# Patient Record
Sex: FEMALE | Race: WHITE | NOT HISPANIC OR LATINO | Employment: FULL TIME | URBAN - NONMETROPOLITAN AREA
[De-identification: names, ages, dates, MRNs, and addresses within clinical notes are randomized per-mention and may not be internally consistent; named-entity substitution may affect disease eponyms.]

---

## 2023-04-24 PROBLEM — R80.9 PROTEINURIA: Status: ACTIVE | Noted: 2023-04-24

## 2023-04-24 PROBLEM — K90.0 ACD (ADULT CELIAC DISEASE) (HHS-HCC): Status: ACTIVE | Noted: 2023-04-24

## 2023-04-24 PROBLEM — K21.9 GERD (GASTROESOPHAGEAL REFLUX DISEASE): Status: ACTIVE | Noted: 2023-04-24

## 2023-04-24 PROBLEM — M54.2 NECK PAIN, CHRONIC: Status: ACTIVE | Noted: 2023-04-24

## 2023-04-24 PROBLEM — R00.2 PALPITATIONS: Status: ACTIVE | Noted: 2023-04-24

## 2023-04-24 PROBLEM — J45.21 EXACERBATION OF INTERMITTENT ASTHMA (HHS-HCC): Status: ACTIVE | Noted: 2023-04-24

## 2023-04-24 PROBLEM — K20.0 EOSINOPHILIC ESOPHAGITIS: Status: ACTIVE | Noted: 2023-04-24

## 2023-04-24 PROBLEM — R91.8 LUNG NODULES: Status: ACTIVE | Noted: 2023-04-24

## 2023-04-24 PROBLEM — I27.20 PULMONARY HYPERTENSION (MULTI): Status: ACTIVE | Noted: 2023-04-24

## 2023-04-24 PROBLEM — E66.01 MORBID OBESITY WITH BODY MASS INDEX (BMI) OF 40.0 OR HIGHER (MULTI): Status: ACTIVE | Noted: 2023-04-24

## 2023-04-24 PROBLEM — M79.7 FIBROMYALGIA: Status: ACTIVE | Noted: 2023-04-24

## 2023-04-24 PROBLEM — K58.9 IBS (IRRITABLE BOWEL SYNDROME): Status: ACTIVE | Noted: 2023-04-24

## 2023-04-24 PROBLEM — E11.9 DIET-CONTROLLED TYPE 2 DIABETES MELLITUS (MULTI): Status: ACTIVE | Noted: 2023-04-24

## 2023-04-24 PROBLEM — R51.9 CHRONIC DAILY HEADACHE: Status: ACTIVE | Noted: 2023-04-24

## 2023-04-24 PROBLEM — E11.59 HYPERTENSION ASSOCIATED WITH DIABETES (MULTI): Status: ACTIVE | Noted: 2023-04-24

## 2023-04-24 PROBLEM — G47.00 INSOMNIA: Status: ACTIVE | Noted: 2023-04-24

## 2023-04-24 PROBLEM — T78.40XA ALLERGIES: Status: ACTIVE | Noted: 2023-04-24

## 2023-04-24 PROBLEM — E78.1 HYPERTRIGLYCERIDEMIA: Status: ACTIVE | Noted: 2023-04-24

## 2023-04-24 PROBLEM — R53.83 FATIGUE: Status: ACTIVE | Noted: 2023-04-24

## 2023-04-24 PROBLEM — E53.8 LOW SERUM VITAMIN B12: Status: ACTIVE | Noted: 2023-04-24

## 2023-04-24 PROBLEM — G89.29 NECK PAIN, CHRONIC: Status: ACTIVE | Noted: 2023-04-24

## 2023-04-24 PROBLEM — M54.31 SCIATICA OF RIGHT SIDE: Status: ACTIVE | Noted: 2023-04-24

## 2023-04-24 PROBLEM — R31.29 OTHER MICROSCOPIC HEMATURIA: Status: ACTIVE | Noted: 2023-04-24

## 2023-04-24 PROBLEM — F32.1 DEPRESSION, MAJOR, SINGLE EPISODE, MODERATE (MULTI): Status: ACTIVE | Noted: 2023-04-24

## 2023-04-24 PROBLEM — M17.0 PRIMARY OSTEOARTHRITIS OF BOTH KNEES: Status: ACTIVE | Noted: 2023-04-24

## 2023-04-24 PROBLEM — G25.81 RLS (RESTLESS LEGS SYNDROME): Status: ACTIVE | Noted: 2023-04-24

## 2023-04-24 PROBLEM — R09.82 PND (POST-NASAL DRIP): Status: ACTIVE | Noted: 2023-04-24

## 2023-04-24 PROBLEM — I48.0 PAROXYSMAL ATRIAL FIBRILLATION (MULTI): Status: ACTIVE | Noted: 2023-04-24

## 2023-04-24 PROBLEM — I15.2 HYPERTENSION ASSOCIATED WITH DIABETES (MULTI): Status: ACTIVE | Noted: 2023-04-24

## 2023-04-24 PROBLEM — M25.561 KNEE PAIN, BILATERAL: Status: ACTIVE | Noted: 2023-04-24

## 2023-04-24 PROBLEM — G47.33 OBSTRUCTIVE SLEEP APNEA, ADULT: Status: ACTIVE | Noted: 2023-04-24

## 2023-04-24 PROBLEM — G62.9 NEUROPATHY, PERIPHERAL: Status: ACTIVE | Noted: 2023-04-24

## 2023-04-24 PROBLEM — E55.9 VITAMIN D DEFICIENCY: Status: ACTIVE | Noted: 2023-04-24

## 2023-04-24 PROBLEM — R10.9 PAIN, FLANK, BILATERAL: Status: ACTIVE | Noted: 2023-04-24

## 2023-04-24 PROBLEM — R29.898 RIGHT ARM WEAKNESS: Status: ACTIVE | Noted: 2023-04-24

## 2023-04-24 PROBLEM — M25.562 KNEE PAIN, BILATERAL: Status: ACTIVE | Noted: 2023-04-24

## 2023-04-24 PROBLEM — J45.30 MILD PERSISTENT ASTHMA (HHS-HCC): Status: ACTIVE | Noted: 2023-04-24

## 2023-04-24 PROBLEM — G56.03 BILATERAL CARPAL TUNNEL SYNDROME: Status: ACTIVE | Noted: 2023-04-24

## 2023-04-24 PROBLEM — K75.81 NASH (NONALCOHOLIC STEATOHEPATITIS): Status: ACTIVE | Noted: 2023-04-24

## 2023-04-24 PROBLEM — G43.109 OCULAR MIGRAINE: Status: ACTIVE | Noted: 2023-04-24

## 2023-04-24 PROBLEM — F03.90 DEMENTIA (MULTI): Status: ACTIVE | Noted: 2023-04-24

## 2023-04-24 PROBLEM — J45.909 ASTHMA (HHS-HCC): Status: ACTIVE | Noted: 2023-04-24

## 2023-04-24 PROBLEM — K90.89 BILE SALT-INDUCED DIARRHEA (HHS-HCC): Status: ACTIVE | Noted: 2023-04-24

## 2023-04-24 PROBLEM — M47.816 DJD (DEGENERATIVE JOINT DISEASE), LUMBAR: Status: ACTIVE | Noted: 2023-04-24

## 2023-04-24 RX ORDER — FLUTICASONE PROPIONATE 50 MCG
1 SPRAY, SUSPENSION (ML) NASAL DAILY
COMMUNITY
Start: 2020-01-16 | End: 2023-06-16 | Stop reason: SDUPTHER

## 2023-04-24 RX ORDER — MOMETASONE FUROATE AND FORMOTEROL FUMARATE DIHYDRATE 200; 5 UG/1; UG/1
2 AEROSOL RESPIRATORY (INHALATION)
COMMUNITY
Start: 2020-04-09 | End: 2023-06-16 | Stop reason: SDUPTHER

## 2023-04-24 RX ORDER — ALBUTEROL SULFATE 90 UG/1
1 AEROSOL, METERED RESPIRATORY (INHALATION) EVERY 4 HOURS PRN
COMMUNITY
Start: 2020-04-09 | End: 2023-06-16 | Stop reason: SDUPTHER

## 2023-04-24 RX ORDER — TOPIRAMATE 100 MG/1
100 TABLET, FILM COATED ORAL 2 TIMES DAILY
COMMUNITY
Start: 2021-01-19 | End: 2023-07-14 | Stop reason: SDUPTHER

## 2023-04-25 ENCOUNTER — APPOINTMENT (OUTPATIENT)
Dept: PRIMARY CARE | Facility: CLINIC | Age: 48
End: 2023-04-25
Payer: MEDICAID

## 2023-04-28 ENCOUNTER — OFFICE VISIT (OUTPATIENT)
Dept: PRIMARY CARE | Facility: CLINIC | Age: 48
End: 2023-04-28
Payer: MEDICAID

## 2023-04-28 VITALS
HEIGHT: 65 IN | HEART RATE: 77 BPM | WEIGHT: 268 LBS | BODY MASS INDEX: 44.65 KG/M2 | SYSTOLIC BLOOD PRESSURE: 133 MMHG | DIASTOLIC BLOOD PRESSURE: 80 MMHG

## 2023-04-28 DIAGNOSIS — E53.8 LOW SERUM VITAMIN B12: ICD-10-CM

## 2023-04-28 DIAGNOSIS — E55.9 VITAMIN D DEFICIENCY: ICD-10-CM

## 2023-04-28 DIAGNOSIS — M79.7 FIBROMYALGIA: ICD-10-CM

## 2023-04-28 DIAGNOSIS — I48.0 PAROXYSMAL ATRIAL FIBRILLATION (MULTI): ICD-10-CM

## 2023-04-28 DIAGNOSIS — R52 BODY ACHES: ICD-10-CM

## 2023-04-28 DIAGNOSIS — I27.20 PULMONARY HYPERTENSION (MULTI): ICD-10-CM

## 2023-04-28 DIAGNOSIS — E66.01 CLASS 3 SEVERE OBESITY DUE TO EXCESS CALORIES WITH SERIOUS COMORBIDITY AND BODY MASS INDEX (BMI) OF 40.0 TO 44.9 IN ADULT (MULTI): ICD-10-CM

## 2023-04-28 DIAGNOSIS — E11.59 HYPERTENSION ASSOCIATED WITH DIABETES (MULTI): Primary | ICD-10-CM

## 2023-04-28 DIAGNOSIS — I15.2 HYPERTENSION ASSOCIATED WITH DIABETES (MULTI): Primary | ICD-10-CM

## 2023-04-28 PROBLEM — J45.21 EXACERBATION OF INTERMITTENT ASTHMA (HHS-HCC): Status: RESOLVED | Noted: 2023-04-24 | Resolved: 2023-04-28

## 2023-04-28 PROBLEM — M54.31 SCIATICA OF RIGHT SIDE: Status: RESOLVED | Noted: 2023-04-24 | Resolved: 2023-04-28

## 2023-04-28 PROBLEM — E66.813 CLASS 3 SEVERE OBESITY DUE TO EXCESS CALORIES WITH SERIOUS COMORBIDITY AND BODY MASS INDEX (BMI) OF 40.0 TO 44.9 IN ADULT: Status: ACTIVE | Noted: 2023-04-24

## 2023-04-28 PROBLEM — J45.909 ASTHMA (HHS-HCC): Status: RESOLVED | Noted: 2023-04-24 | Resolved: 2023-04-28

## 2023-04-28 PROCEDURE — 3075F SYST BP GE 130 - 139MM HG: CPT | Performed by: NURSE PRACTITIONER

## 2023-04-28 PROCEDURE — 3008F BODY MASS INDEX DOCD: CPT | Performed by: NURSE PRACTITIONER

## 2023-04-28 PROCEDURE — 3079F DIAST BP 80-89 MM HG: CPT | Performed by: NURSE PRACTITIONER

## 2023-04-28 PROCEDURE — 99204 OFFICE O/P NEW MOD 45 MIN: CPT | Performed by: NURSE PRACTITIONER

## 2023-04-28 PROCEDURE — 1036F TOBACCO NON-USER: CPT | Performed by: NURSE PRACTITIONER

## 2023-04-28 RX ORDER — FUROSEMIDE 20 MG/1
20 TABLET ORAL
COMMUNITY

## 2023-04-28 ASSESSMENT — ENCOUNTER SYMPTOMS
ARTHRALGIAS: 0
APNEA: 0
EYE PAIN: 0
BACK PAIN: 0
MYALGIAS: 0
NAUSEA: 0
UNEXPECTED WEIGHT CHANGE: 0
BLOOD IN STOOL: 0
JOINT SWELLING: 0
WOUND: 0
SLEEP DISTURBANCE: 0
DIFFICULTY URINATING: 0
SORE THROAT: 0
NERVOUS/ANXIOUS: 0
FREQUENCY: 0
DYSURIA: 0
COUGH: 0
SEIZURES: 0
NUMBNESS: 0
PALPITATIONS: 0
WHEEZING: 0
WEAKNESS: 0
NECK PAIN: 0
FATIGUE: 0
CONSTIPATION: 0
CHOKING: 0
CHEST TIGHTNESS: 0
EYE REDNESS: 0
VOMITING: 0
TROUBLE SWALLOWING: 0
SPEECH DIFFICULTY: 0
ABDOMINAL DISTENTION: 0
ABDOMINAL PAIN: 0
FLANK PAIN: 0
PHOTOPHOBIA: 0
HEADACHES: 0
DIARRHEA: 0
CHILLS: 0
FEVER: 0
HEMATURIA: 0
CONFUSION: 0
DIZZINESS: 0
SHORTNESS OF BREATH: 0
FACIAL ASYMMETRY: 0

## 2023-04-28 ASSESSMENT — PATIENT HEALTH QUESTIONNAIRE - PHQ9
1. LITTLE INTEREST OR PLEASURE IN DOING THINGS: NOT AT ALL
2. FEELING DOWN, DEPRESSED OR HOPELESS: NOT AT ALL
SUM OF ALL RESPONSES TO PHQ9 QUESTIONS 1 AND 2: 0

## 2023-04-28 NOTE — PROGRESS NOTES
"Subjective   Patient ID: Inge Vance is a 47 y.o. female who presents for Establish Care (RE-EST. C/O BODY/SKIN PAIN, FATIGUE HAS NO ENERGY DX WITH MILD SLEEP APNEA BUT INSURANCE WOULD NOT APPROVE A CPAP).      HPI:  Presents today for C/O  BODY PAIN, FATIGUE, AND NO ENERGY X SEVERAL MONTHS - YEARS  modifying factors consists of HAS KENDY AND FIBRO  associated symptoms consist of B/L KNEES, HIPS, FEET MOSTLY  prior treatment consists of medication OTC TYLENOL AND IBUPROFEN        Visit Vitals  /80   Pulse 77   Ht 1.651 m (5' 5\")   Wt 122 kg (268 lb)   BMI 44.60 kg/m²   Smoking Status Never   BSA 2.37 m²        Review of Systems   Constitutional:  Negative for chills, fatigue, fever and unexpected weight change.   HENT:  Negative for congestion, ear pain, sore throat and trouble swallowing.    Eyes:  Negative for photophobia, pain, redness and visual disturbance.   Respiratory:  Negative for apnea, cough, choking, chest tightness, shortness of breath and wheezing.    Cardiovascular:  Negative for chest pain, palpitations and leg swelling.   Gastrointestinal:  Negative for abdominal distention, abdominal pain, blood in stool, constipation, diarrhea, nausea and vomiting.   Genitourinary:  Negative for difficulty urinating, dysuria, flank pain, frequency, hematuria and urgency.   Musculoskeletal:  Negative for arthralgias, back pain, gait problem, joint swelling, myalgias and neck pain.   Skin:  Negative for rash and wound.   Neurological:  Negative for dizziness, seizures, syncope, facial asymmetry, speech difficulty, weakness, numbness and headaches.   Psychiatric/Behavioral:  Negative for confusion, sleep disturbance and suicidal ideas. The patient is not nervous/anxious.        Objective     Physical Exam  Constitutional:       Appearance: Normal appearance. She is normal weight.   HENT:      Head: Normocephalic.   Eyes:      Extraocular Movements: Extraocular movements intact.      Conjunctiva/sclera: " Conjunctivae normal.      Pupils: Pupils are equal, round, and reactive to light.   Cardiovascular:      Rate and Rhythm: Normal rate and regular rhythm.      Pulses: Normal pulses.      Heart sounds: Normal heart sounds.   Pulmonary:      Effort: Pulmonary effort is normal.      Breath sounds: Normal breath sounds.   Musculoskeletal:         General: Normal range of motion.      Cervical back: Normal range of motion.      Comments: JOINT PAIN   Skin:     General: Skin is warm and dry.   Neurological:      General: No focal deficit present.      Mental Status: She is alert and oriented to person, place, and time.   Psychiatric:         Mood and Affect: Mood normal.         Behavior: Behavior normal.         Thought Content: Thought content normal.         Judgment: Judgment normal.            Assessment/Plan   Problem List Items Addressed This Visit          Nervous    Body aches    Relevant Orders    Hemoglobin A1C    Comprehensive Metabolic Panel    Lipid Panel    TSH with reflex to Free T4 if abnormal    CBC and Auto Differential    Uric Acid    Vitamin D, Total    Vitamin B12    Ferritin    Folate    Iron and TIBC    Parathyroid Hormone, Intact    Magnesium    CARSON with Reflex to CARLOS    Citrulline Antibody, IgG    C-Reactive Protein    Rheumatoid Factor    Sedimentation Rate    Mononucleosis screen    Lyme Ab Screen + Reflex To Immunoblot; >4 Wks Post Symptoms       Circulatory    Hypertension associated with diabetes (CMS/HCC) - Primary    Relevant Orders    Hemoglobin A1C    Comprehensive Metabolic Panel    Lipid Panel    TSH with reflex to Free T4 if abnormal    CBC and Auto Differential    Uric Acid    Vitamin D, Total    Vitamin B12    Ferritin    Folate    Iron and TIBC    Parathyroid Hormone, Intact    Magnesium    CARSON with Reflex to CARLOS    Citrulline Antibody, IgG    C-Reactive Protein    Rheumatoid Factor    Sedimentation Rate    Paroxysmal atrial fibrillation (CMS/HCC)    Pulmonary hypertension  (CMS/[facility])       Musculoskeletal    Fibromyalgia    Relevant Orders    Hemoglobin A1C    Comprehensive Metabolic Panel    Lipid Panel    TSH with reflex to Free T4 if abnormal    CBC and Auto Differential    Uric Acid    Vitamin D, Total    Vitamin B12    Ferritin    Folate    Iron and TIBC    Parathyroid Hormone, Intact    Magnesium    CARSON with Reflex to CARLOS    Citrulline Antibody, IgG    C-Reactive Protein    Rheumatoid Factor    Sedimentation Rate       Endocrine/Metabolic    Class 3 severe obesity due to excess calories with serious comorbidity and body mass index (BMI) of 40.0 to 44.9 in adult (CMS/[facility])    Vitamin D deficiency    Relevant Orders    Hemoglobin A1C    Comprehensive Metabolic Panel    Lipid Panel    TSH with reflex to Free T4 if abnormal    CBC and Auto Differential    Uric Acid    Vitamin D, Total    Vitamin B12    Ferritin    Folate    Iron and TIBC    Parathyroid Hormone, Intact    Magnesium    CARSON with Reflex to CARLOS    Citrulline Antibody, IgG    C-Reactive Protein    Rheumatoid Factor    Sedimentation Rate       Hematologic    Low serum vitamin B12    Relevant Orders    Hemoglobin A1C    Comprehensive Metabolic Panel    Lipid Panel    TSH with reflex to Free T4 if abnormal    CBC and Auto Differential    Uric Acid    Vitamin D, Total    Vitamin B12    Ferritin    Folate    Iron and TIBC    Parathyroid Hormone, Intact    Magnesium    CARSON with Reflex to CARLOS    Citrulline Antibody, IgG    C-Reactive Protein    Rheumatoid Factor    Sedimentation Rate     PLEASE MONITOR CLOSELY FOR ANY UNTOWARD SIDE EFFECTS OR COMPLICATIONS OF MEDICATIONS. PATIENT IS STRONGLY ADVISED TO BE COMPLIANT WITH RECOMMENDATIONS. QUESTIONS AND CONCERNS WERE ADDRESSED. INSTRUCTED TO CALL, RETURN SOONER, OR GO TO THE ER,  IF SYMPTOMS PERSIST OR WORSEN. THEY VOICED UNDERSTANDING AND  DENIES FURTHER QUESTIONS AT THIS TIME.    TIME CODE  1. PREPARATION FOR PATIENT'S VISIT (REVIEWING CHART, CURRENT MEDICAL RECORDS, OUTSIDE HEALTH  PROVIDER RECORDS, PREVIOUS HISTORY, EXAM, TEST, PROCEDURE, AND MEDICATIONS)  2. FACE TO FACE ENCOUNTER OBTAINING HISTORY FROM THE PATIENT/FAMILY/CAREGIVERS; PERFORMING EVALUATION AND EXAMINATION; ORDERING TESTS OR PROCEDURES; REFERRING AND COMMUNICATING WITH OTHER HEALTHCARE PROVIDERS; COUNSELING AND EDUCATION OF THE PATIENT/FAMILY/CAREGIVERS; INDEPENDENTLY INTERPRETING RESULTS (TESTS, LABS, PROCEDURES, IMAGING) AND COMMUNICATING AND EXPLAINING RESULTS TO THE PATIENT/FAMILY/CAREGIVERS  3. COORDINATION OF CARE; PREPARING AND PRINTING DISCHARGE INSTRUCTIONS AND ANY EDUCATIONAL MATERIAL FOR THE PATIENT/FAMILY/CAREGIVERS. DOCUMENTING CLINICAL INFORMATION IN THE ELECTRONIC MEDICAL RECORD   4. REVIEWING OARRS AS NEEDED    MDM  1) COMPLEXITY: MORE THAN 1 STABLE CHRONIC CONDITION ADDRESSED OR 1 ACUTE ILLNESS ADDRESSED   2)DATA: TESTS INTERPRETED AND OR ORDERED, TOOK INDEPENDENT HISTORY OR RECORDS REVIEWED  3)RISK: MODERATE RISK DUE TO NATURE OF MEDICAL CONDITIONS/COMORBIDITY OR MEDICATIONS ORDERED OR SURGICAL OR PROCEDURE REFERRAL    2 WEEKS WITH FASTING LABS

## 2023-04-29 ENCOUNTER — LAB (OUTPATIENT)
Dept: LAB | Facility: LAB | Age: 48
End: 2023-04-29
Payer: MEDICAID

## 2023-04-29 DIAGNOSIS — R52 BODY ACHES: ICD-10-CM

## 2023-04-29 DIAGNOSIS — I15.2 HYPERTENSION ASSOCIATED WITH DIABETES (MULTI): ICD-10-CM

## 2023-04-29 DIAGNOSIS — E55.9 VITAMIN D DEFICIENCY: ICD-10-CM

## 2023-04-29 DIAGNOSIS — M79.7 FIBROMYALGIA: ICD-10-CM

## 2023-04-29 DIAGNOSIS — E11.59 HYPERTENSION ASSOCIATED WITH DIABETES (MULTI): ICD-10-CM

## 2023-04-29 DIAGNOSIS — E53.8 LOW SERUM VITAMIN B12: ICD-10-CM

## 2023-04-29 LAB
ALANINE AMINOTRANSFERASE (SGPT) (U/L) IN SER/PLAS: 17 U/L (ref 7–45)
ALBUMIN (G/DL) IN SER/PLAS: 4 G/DL (ref 3.4–5)
ALBUMIN (MG/L) IN URINE: 772.1 MG/L
ALBUMIN/CREATININE (UG/MG) IN URINE: 543.7 UG/MG CRT (ref 0–30)
ALKALINE PHOSPHATASE (U/L) IN SER/PLAS: 51 U/L (ref 33–110)
ANION GAP IN SER/PLAS: 11 MMOL/L (ref 10–20)
ASPARTATE AMINOTRANSFERASE (SGOT) (U/L) IN SER/PLAS: 12 U/L (ref 9–39)
BASOPHILS (10*3/UL) IN BLOOD BY AUTOMATED COUNT: 0.05 X10E9/L (ref 0–0.1)
BASOPHILS/100 LEUKOCYTES IN BLOOD BY AUTOMATED COUNT: 0.7 % (ref 0–2)
BILIRUBIN TOTAL (MG/DL) IN SER/PLAS: 0.4 MG/DL (ref 0–1.2)
C REACTIVE PROTEIN (MG/L) IN SER/PLAS: 0.65 MG/DL
CALCIDIOL (25 OH VITAMIN D3) (NG/ML) IN SER/PLAS: 14 NG/ML
CALCIUM (MG/DL) IN SER/PLAS: 8.9 MG/DL (ref 8.6–10.3)
CARBON DIOXIDE, TOTAL (MMOL/L) IN SER/PLAS: 25 MMOL/L (ref 21–32)
CHLORIDE (MMOL/L) IN SER/PLAS: 108 MMOL/L (ref 98–107)
CHOLESTEROL (MG/DL) IN SER/PLAS: 179 MG/DL (ref 0–199)
CHOLESTEROL IN HDL (MG/DL) IN SER/PLAS: 52 MG/DL
CHOLESTEROL/HDL RATIO: 3.4
COBALAMIN (VITAMIN B12) (PG/ML) IN SER/PLAS: 349 PG/ML (ref 211–911)
CREATININE (MG/DL) IN SER/PLAS: 0.58 MG/DL (ref 0.5–1.05)
CREATININE (MG/DL) IN URINE: 142 MG/DL (ref 20–320)
EOSINOPHILS (10*3/UL) IN BLOOD BY AUTOMATED COUNT: 0.33 X10E9/L (ref 0–0.7)
EOSINOPHILS/100 LEUKOCYTES IN BLOOD BY AUTOMATED COUNT: 4.7 % (ref 0–6)
ERYTHROCYTE DISTRIBUTION WIDTH (RATIO) BY AUTOMATED COUNT: 11.9 % (ref 11.5–14.5)
ERYTHROCYTE MEAN CORPUSCULAR HEMOGLOBIN CONCENTRATION (G/DL) BY AUTOMATED: 32.8 G/DL (ref 32–36)
ERYTHROCYTE MEAN CORPUSCULAR VOLUME (FL) BY AUTOMATED COUNT: 93 FL (ref 80–100)
ERYTHROCYTES (10*6/UL) IN BLOOD BY AUTOMATED COUNT: 4.29 X10E12/L (ref 4–5.2)
ESTIMATED AVERAGE GLUCOSE FOR HBA1C: 105 MG/DL
FERRITIN (UG/LL) IN SER/PLAS: 120 UG/L (ref 8–150)
FOLATE (NG/ML) IN SER/PLAS: 12.9 NG/ML
GFR FEMALE: >90 ML/MIN/1.73M2
GLUCOSE (MG/DL) IN SER/PLAS: 97 MG/DL (ref 74–99)
HEMATOCRIT (%) IN BLOOD BY AUTOMATED COUNT: 40 % (ref 36–46)
HEMOGLOBIN (G/DL) IN BLOOD: 13.1 G/DL (ref 12–16)
HEMOGLOBIN A1C/HEMOGLOBIN TOTAL IN BLOOD: 5.3 %
IMMATURE GRANULOCYTES/100 LEUKOCYTES IN BLOOD BY AUTOMATED COUNT: 0.3 % (ref 0–0.9)
IRON (UG/DL) IN SER/PLAS: 85 UG/DL (ref 35–150)
IRON BINDING CAPACITY (UG/DL) IN SER/PLAS: 297 UG/DL (ref 240–445)
IRON SATURATION (%) IN SER/PLAS: 29 % (ref 25–45)
LDL: 82 MG/DL (ref 0–99)
LEUKOCYTES (10*3/UL) IN BLOOD BY AUTOMATED COUNT: 7 X10E9/L (ref 4.4–11.3)
LYMPHOCYTES (10*3/UL) IN BLOOD BY AUTOMATED COUNT: 2.08 X10E9/L (ref 1.2–4.8)
LYMPHOCYTES/100 LEUKOCYTES IN BLOOD BY AUTOMATED COUNT: 29.8 % (ref 13–44)
MAGNESIUM (MG/DL) IN SER/PLAS: 1.77 MG/DL (ref 1.6–2.4)
MONOCYTES (10*3/UL) IN BLOOD BY AUTOMATED COUNT: 0.48 X10E9/L (ref 0.1–1)
MONOCYTES/100 LEUKOCYTES IN BLOOD BY AUTOMATED COUNT: 6.9 % (ref 2–10)
MONONUCLEOSIS SCREEN: NEGATIVE
NEUTROPHILS (10*3/UL) IN BLOOD BY AUTOMATED COUNT: 4.01 X10E9/L (ref 1.2–7.7)
NEUTROPHILS/100 LEUKOCYTES IN BLOOD BY AUTOMATED COUNT: 57.6 % (ref 40–80)
NON HDL CHOLESTEROL: 127 MG/DL
PLATELETS (10*3/UL) IN BLOOD AUTOMATED COUNT: 382 X10E9/L (ref 150–450)
POTASSIUM (MMOL/L) IN SER/PLAS: 4.2 MMOL/L (ref 3.5–5.3)
PROTEIN TOTAL: 6.2 G/DL (ref 6.4–8.2)
RHEUMATOID FACTOR (IU/ML) IN SERUM OR PLASMA: <10 IU/ML (ref 0–15)
SEDIMENTATION RATE, ERYTHROCYTE: 15 MM/H (ref 0–20)
SODIUM (MMOL/L) IN SER/PLAS: 140 MMOL/L (ref 136–145)
THYROTROPIN (MIU/L) IN SER/PLAS BY DETECTION LIMIT <= 0.05 MIU/L: 2.51 MIU/L (ref 0.44–3.98)
TRIGLYCERIDE (MG/DL) IN SER/PLAS: 226 MG/DL (ref 0–149)
URATE (MG/DL) IN SER/PLAS: 5.8 MG/DL (ref 2.3–6.7)
UREA NITROGEN (MG/DL) IN SER/PLAS: 11 MG/DL (ref 6–23)
VLDL: 45 MG/DL (ref 0–40)

## 2023-04-29 PROCEDURE — 82043 UR ALBUMIN QUANTITATIVE: CPT

## 2023-04-29 PROCEDURE — 83550 IRON BINDING TEST: CPT

## 2023-04-29 PROCEDURE — 85652 RBC SED RATE AUTOMATED: CPT

## 2023-04-29 PROCEDURE — 86140 C-REACTIVE PROTEIN: CPT

## 2023-04-29 PROCEDURE — 86038 ANTINUCLEAR ANTIBODIES: CPT

## 2023-04-29 PROCEDURE — 86431 RHEUMATOID FACTOR QUANT: CPT

## 2023-04-29 PROCEDURE — 82570 ASSAY OF URINE CREATININE: CPT

## 2023-04-29 PROCEDURE — 83036 HEMOGLOBIN GLYCOSYLATED A1C: CPT

## 2023-04-29 PROCEDURE — 83970 ASSAY OF PARATHORMONE: CPT

## 2023-04-29 PROCEDURE — 86200 CCP ANTIBODY: CPT

## 2023-04-29 PROCEDURE — 84443 ASSAY THYROID STIM HORMONE: CPT

## 2023-04-29 PROCEDURE — 86618 LYME DISEASE ANTIBODY: CPT

## 2023-04-29 PROCEDURE — 82746 ASSAY OF FOLIC ACID SERUM: CPT

## 2023-04-29 PROCEDURE — 85025 COMPLETE CBC W/AUTO DIFF WBC: CPT

## 2023-04-29 PROCEDURE — 86308 HETEROPHILE ANTIBODY SCREEN: CPT

## 2023-04-29 PROCEDURE — 84550 ASSAY OF BLOOD/URIC ACID: CPT

## 2023-04-29 PROCEDURE — 83540 ASSAY OF IRON: CPT

## 2023-04-29 PROCEDURE — 82728 ASSAY OF FERRITIN: CPT

## 2023-04-29 PROCEDURE — 82306 VITAMIN D 25 HYDROXY: CPT

## 2023-04-29 PROCEDURE — 83735 ASSAY OF MAGNESIUM: CPT

## 2023-04-29 PROCEDURE — 86225 DNA ANTIBODY NATIVE: CPT

## 2023-04-29 PROCEDURE — 86039 ANTINUCLEAR ANTIBODIES (ANA): CPT

## 2023-04-29 PROCEDURE — 80053 COMPREHEN METABOLIC PANEL: CPT

## 2023-04-29 PROCEDURE — 36415 COLL VENOUS BLD VENIPUNCTURE: CPT

## 2023-04-29 PROCEDURE — 82607 VITAMIN B-12: CPT

## 2023-04-29 PROCEDURE — 80061 LIPID PANEL: CPT

## 2023-04-29 PROCEDURE — 86235 NUCLEAR ANTIGEN ANTIBODY: CPT

## 2023-05-01 LAB — PARATHYRIN INTACT (PG/ML) IN SER/PLAS: 60.9 PG/ML (ref 18.5–88)

## 2023-05-03 LAB
ANA PATTERN: ABNORMAL
ANA TITER: ABNORMAL
ANTI-CENTROMERE: <0.2 AI
ANTI-CHROMATIN: <0.2 AI
ANTI-DNA (DS): <1 IU/ML
ANTI-JO-1 IGG: <0.2 AI
ANTI-NUCLEAR ANTIBODY (ANA): POSITIVE
ANTI-RIBOSOMAL P: <0.2 AI
ANTI-RNP: 0.7 AI
ANTI-SCL-70: <0.2 AI
ANTI-SM/RNP: <0.2 AI
ANTI-SM: <0.2 AI
ANTI-SSA: <0.2 AI
ANTI-SSB: <0.2 AI
CITRULLINE ANTIBODY, IGG: 10 UNITS (ref 0–19)
LYME ANTIBODIES SCREEN: 0.26 LIV (ref 0–1.2)

## 2023-05-12 ENCOUNTER — OFFICE VISIT (OUTPATIENT)
Dept: PRIMARY CARE | Facility: CLINIC | Age: 48
End: 2023-05-12
Payer: MEDICAID

## 2023-05-12 VITALS
HEIGHT: 65 IN | DIASTOLIC BLOOD PRESSURE: 69 MMHG | SYSTOLIC BLOOD PRESSURE: 105 MMHG | BODY MASS INDEX: 44.65 KG/M2 | HEART RATE: 76 BPM | WEIGHT: 268 LBS

## 2023-05-12 DIAGNOSIS — E55.9 VITAMIN D DEFICIENCY: ICD-10-CM

## 2023-05-12 DIAGNOSIS — E53.8 LOW SERUM VITAMIN B12: ICD-10-CM

## 2023-05-12 DIAGNOSIS — E53.8 B12 DEFICIENCY: ICD-10-CM

## 2023-05-12 DIAGNOSIS — I15.2 HYPERTENSION ASSOCIATED WITH DIABETES (MULTI): Primary | ICD-10-CM

## 2023-05-12 DIAGNOSIS — E66.01 CLASS 3 SEVERE OBESITY DUE TO EXCESS CALORIES WITH SERIOUS COMORBIDITY AND BODY MASS INDEX (BMI) OF 40.0 TO 44.9 IN ADULT (MULTI): ICD-10-CM

## 2023-05-12 DIAGNOSIS — M25.522 LEFT ELBOW PAIN: ICD-10-CM

## 2023-05-12 DIAGNOSIS — E11.59 HYPERTENSION ASSOCIATED WITH DIABETES (MULTI): Primary | ICD-10-CM

## 2023-05-12 PROCEDURE — 1036F TOBACCO NON-USER: CPT | Performed by: NURSE PRACTITIONER

## 2023-05-12 PROCEDURE — 3078F DIAST BP <80 MM HG: CPT | Performed by: NURSE PRACTITIONER

## 2023-05-12 PROCEDURE — 96372 THER/PROPH/DIAG INJ SC/IM: CPT | Performed by: NURSE PRACTITIONER

## 2023-05-12 PROCEDURE — 3044F HG A1C LEVEL LT 7.0%: CPT | Performed by: NURSE PRACTITIONER

## 2023-05-12 PROCEDURE — 99214 OFFICE O/P EST MOD 30 MIN: CPT | Performed by: NURSE PRACTITIONER

## 2023-05-12 PROCEDURE — 3008F BODY MASS INDEX DOCD: CPT | Performed by: NURSE PRACTITIONER

## 2023-05-12 PROCEDURE — 3074F SYST BP LT 130 MM HG: CPT | Performed by: NURSE PRACTITIONER

## 2023-05-12 RX ORDER — FERROUS SULFATE, DRIED 160(50) MG
1 TABLET, EXTENDED RELEASE ORAL 2 TIMES DAILY
Qty: 180 TABLET | Refills: 3 | Status: SHIPPED | OUTPATIENT
Start: 2023-05-12

## 2023-05-12 RX ORDER — LANCETS
EACH MISCELLANEOUS
Qty: 100 EACH | Refills: 11 | Status: SHIPPED | OUTPATIENT
Start: 2023-05-12

## 2023-05-12 RX ORDER — IBUPROFEN 200 MG
CAPSULE ORAL
COMMUNITY
End: 2023-05-12 | Stop reason: SDUPTHER

## 2023-05-12 RX ORDER — ERGOCALCIFEROL 1.25 MG/1
50000 CAPSULE ORAL
Qty: 13 CAPSULE | Refills: 3 | Status: SHIPPED | OUTPATIENT
Start: 2023-05-12 | End: 2023-08-04

## 2023-05-12 RX ORDER — NAPROXEN 500 MG/1
500 TABLET ORAL 2 TIMES DAILY PRN
Qty: 60 TABLET | Refills: 0 | Status: SHIPPED | OUTPATIENT
Start: 2023-05-12 | End: 2023-08-10

## 2023-05-12 RX ORDER — LANCETS
EACH MISCELLANEOUS
COMMUNITY
End: 2023-05-12 | Stop reason: SDUPTHER

## 2023-05-12 RX ORDER — PNV NO.95/FERROUS FUM/FOLIC AC 28MG-0.8MG
100 TABLET ORAL EVERY OTHER DAY
Qty: 45 TABLET | Refills: 3 | Status: SHIPPED | OUTPATIENT
Start: 2023-05-12 | End: 2024-05-11

## 2023-05-12 RX ORDER — SEMAGLUTIDE 0.25 MG/.5ML
0.25 INJECTION, SOLUTION SUBCUTANEOUS
Qty: 4 ML | Refills: 1 | Status: SHIPPED | OUTPATIENT
Start: 2023-05-12 | End: 2023-05-19 | Stop reason: ALTCHOICE

## 2023-05-12 RX ORDER — PREDNISONE 10 MG/1
30 TABLET ORAL DAILY
Qty: 15 TABLET | Refills: 0 | Status: SHIPPED | OUTPATIENT
Start: 2023-05-12 | End: 2023-07-14 | Stop reason: ALTCHOICE

## 2023-05-12 RX ORDER — INSULIN PUMP SYRINGE, 3 ML
1 EACH MISCELLANEOUS AS NEEDED
COMMUNITY
End: 2023-05-12 | Stop reason: SDUPTHER

## 2023-05-12 RX ORDER — CYANOCOBALAMIN 1000 UG/ML
1000 INJECTION, SOLUTION INTRAMUSCULAR; SUBCUTANEOUS ONCE
Status: COMPLETED | OUTPATIENT
Start: 2023-05-12 | End: 2023-05-12

## 2023-05-12 RX ORDER — IBUPROFEN 200 MG
CAPSULE ORAL
Qty: 100 STRIP | Refills: 11 | Status: SHIPPED | OUTPATIENT
Start: 2023-05-12

## 2023-05-12 RX ORDER — INSULIN PUMP SYRINGE, 3 ML
EACH MISCELLANEOUS
Qty: 1 EACH | Refills: 0 | Status: SHIPPED | OUTPATIENT
Start: 2023-05-12

## 2023-05-12 RX ADMIN — CYANOCOBALAMIN 1000 MCG: 1000 INJECTION, SOLUTION INTRAMUSCULAR; SUBCUTANEOUS at 08:20

## 2023-05-12 ASSESSMENT — ENCOUNTER SYMPTOMS
FEVER: 0
TROUBLE SWALLOWING: 0
SPEECH DIFFICULTY: 0
NUMBNESS: 0
CHOKING: 0
JOINT SWELLING: 0
DIZZINESS: 0
CHEST TIGHTNESS: 0
SHORTNESS OF BREATH: 0
BLOOD IN STOOL: 0
BACK PAIN: 0
CHILLS: 0
APNEA: 0
PHOTOPHOBIA: 0
EYE PAIN: 0
FREQUENCY: 0
ABDOMINAL DISTENTION: 0
PALPITATIONS: 0
FLANK PAIN: 0
NECK PAIN: 0
UNEXPECTED WEIGHT CHANGE: 0
HEADACHES: 0
NERVOUS/ANXIOUS: 0
SEIZURES: 0
VOMITING: 0
WHEEZING: 0
CONSTIPATION: 0
NAUSEA: 0
WOUND: 0
COUGH: 0
ARTHRALGIAS: 1
SLEEP DISTURBANCE: 0
DIARRHEA: 0
HEMATURIA: 0
FATIGUE: 0
CONFUSION: 0
SORE THROAT: 0
ABDOMINAL PAIN: 0
EYE REDNESS: 0
WEAKNESS: 0
DYSURIA: 0
MYALGIAS: 0
DIFFICULTY URINATING: 0
FACIAL ASYMMETRY: 0

## 2023-05-12 NOTE — PROGRESS NOTES
"Subjective   Patient ID: Igne Vance is a 47 y.o. female who presents for Follow-up (2 WK F/U WITH LABS. C/O LT ELBOW PAIN).      HPI:  Presents today for [facility] [place] LABS. C/O LEFT ELBOW PAIN X 1 MONTH  modifying factors consists of DENIES INJURY  associated symptoms consist of TENDERNESS  prior treatment consists of medication TYLENOL AND ALEVE       B12- INJECTION TODAY AND START EVERY OTHER DAY  VIT D- START CALCIUM WITH VIT D AND WEEKLY VIT D  DM- START OZEMPIC       Visit Vitals  /69   Pulse 76   Ht 1.651 m (5' 5\")   Wt 122 kg (268 lb)   BMI 44.60 kg/m²   Smoking Status Never   BSA 2.37 m²        Review of Systems   Constitutional:  Negative for chills, fatigue, fever and unexpected weight change.   HENT:  Negative for congestion, ear pain, sore throat and trouble swallowing.    Eyes:  Negative for photophobia, pain, redness and visual disturbance.   Respiratory:  Negative for apnea, cough, choking, chest tightness, shortness of breath and wheezing.    Cardiovascular:  Negative for chest pain, palpitations and leg swelling.   Gastrointestinal:  Negative for abdominal distention, abdominal pain, blood in stool, constipation, diarrhea, nausea and vomiting.   Genitourinary:  Negative for difficulty urinating, dysuria, flank pain, frequency, hematuria and urgency.   Musculoskeletal:  Positive for arthralgias. Negative for back pain, gait problem, joint swelling, myalgias and neck pain.   Skin:  Negative for rash and wound.   Neurological:  Negative for dizziness, seizures, syncope, facial asymmetry, speech difficulty, weakness, numbness and headaches.   Psychiatric/Behavioral:  Negative for confusion, sleep disturbance and suicidal ideas. The patient is not nervous/anxious.        Objective   Component      Latest Ref Rng 4/29/2023   WBC      4.4 - 11.3 x10E9/L 7.0    RBC      4.00 - 5.20 x10E12/L 4.29    HEMOGLOBIN      12.0 - 16.0 g/dL 13.1    HEMATOCRIT      36.0 - 46.0 % 40.0    MCV      80 - 100 fL 93  "   MCHC      32.0 - 36.0 g/dL 32.8    Platelets      150 - 450 x10E9/L 382    RED CELL DISTRIBUTION WIDTH      11.5 - 14.5 % 11.9    Neutrophils %      40.0 - 80.0 % 57.6    Immature Granulocytes %, Automated      0.0 - 0.9 % 0.3    Lymphocytes %      13.0 - 44.0 % 29.8    Monocytes %      2.0 - 10.0 % 6.9    Eosinophils %      0.0 - 6.0 % 4.7    Basophils %      0.0 - 2.0 % 0.7    Neutrophils Absolute      1.20 - 7.70 x10E9/L 4.01    Lymphocytes Absolute      1.20 - 4.80 x10E9/L 2.08    Monocytes Absolute      0.10 - 1.00 x10E9/L 0.48    Eosinophils Absolute      0.00 - 0.70 x10E9/L 0.33    Basophils Absolute      0.00 - 0.10 x10E9/L 0.05    GLUCOSE      74 - 99 mg/dL 97    SODIUM      136 - 145 mmol/L 140    POTASSIUM      3.5 - 5.3 mmol/L 4.2    CHLORIDE      98 - 107 mmol/L 108 (H)    Bicarbonate      21 - 32 mmol/L 25    Anion Gap      10 - 20 mmol/L 11    Blood Urea Nitrogen      6 - 23 mg/dL 11    Creatinine      0.50 - 1.05 mg/dL 0.58    GFR Female      >90 mL/min/1.73m2 >90    Calcium      8.6 - 10.3 mg/dL 8.9    Albumin      3.4 - 5.0 g/dL 4.0    Alkaline Phosphatase      33 - 110 U/L 51    Total Protein      6.4 - 8.2 g/dL 6.2 (L)    AST      9 - 39 U/L 12    Bilirubin Total      0.0 - 1.2 mg/dL 0.4    ALT      7 - 45 U/L 17    Anti-SM      AI <0.2    Anti-RNP      AI 0.7    Anti-SM/RNP      AI <0.2    Anti-SSA      AI <0.2    Anti-SSB      AI <0.2    Anti-SCL-70      AI <0.2    Anti-JESUS-1 IgG      AI <0.2    Anti-Chromatin      AI <0.2    Anti-Centromere      AI <0.2    ANTI-RIBOSOMAL P      AI <0.2    Anti-DNA (DS)      IU/mL <1.0    CHOLESTEROL      0 - 199 mg/dL 179    HDL CHOLESTEROL      mg/dL 52.0    Cholesterol/HDL Ratio 3.4    LDL      0 - 99 mg/dL 82    VLDL      0 - 40 mg/dL 45 (H)    TRIGLYCERIDES      0 - 149 mg/dL 226 (H)    Non HDL Cholesterol      mg/dL 127    IRON      35 - 150 ug/dL 85    TIBC      240 - 445 ug/dL 297    % Saturation      25 - 45 % 29    CARSON      NEGATIVE  POSITIVE !     CARSON Titer      <1:80  1:160    CARSON Pattern HOMOGENEOUS    ALBUMIN (MG/L) IN URINE      Not Established mg/L 772.1    Albumin/Creatine Ratio      0.0 - 30.0 ug/mg crt 543.7 (H)    Creatinine, Urine Random      20.0 - 320.0 mg/dL 142.0    Hemoglobin A1C      % 5.3    Estimated Average Glucose      MG/    Thyroid Stimulating Hormone      0.44 - 3.98 mIU/L 2.51    URIC ACID      2.3 - 6.7 mg/dL 5.8    Vitamin D, 25-Hydroxy, Total      ng/mL 14 !    Vitamin B12      211 - 911 pg/mL 349    FERRITIN      8 - 150 ug/L 120    FOLATE      >5.0 ng/mL 12.9    Parathyroid Hormone, Intact      18.5 - 88.0 pg/mL 60.9    MAGNESIUM      1.60 - 2.40 mg/dL 1.77    Citrulline Antibody, IgG      0 - 19 Units 10    C-Reactive Protein      mg/dL 0.65    Rheumatoid Factor      0 - 15 IU/mL <10    Sed Rate      0 - 20 mm/h 15    Mononucleosis Screen      NEGATIVE  NEGATIVE    Lyme Antibodies Screen      0.00 - 1.20 VIOLETA 0.26       Legend:  (H) High  (L) Low  ! Abnormal  Physical Exam  Constitutional:       Appearance: Normal appearance. She is normal weight.   HENT:      Head: Normocephalic.   Eyes:      Extraocular Movements: Extraocular movements intact.      Conjunctiva/sclera: Conjunctivae normal.      Pupils: Pupils are equal, round, and reactive to light.   Cardiovascular:      Rate and Rhythm: Normal rate and regular rhythm.      Pulses: Normal pulses.      Heart sounds: Normal heart sounds.   Pulmonary:      Effort: Pulmonary effort is normal.      Breath sounds: Normal breath sounds.   Musculoskeletal:         General: Normal range of motion.      Cervical back: Normal range of motion.      Comments: LEFT ELBOW TENDERNESS. PAIN WITH ROM    Skin:     General: Skin is warm and dry.   Neurological:      General: No focal deficit present.      Mental Status: She is alert and oriented to person, place, and time.   Psychiatric:         Mood and Affect: Mood normal.         Behavior: Behavior normal.         Thought Content:  Thought content normal.         Judgment: Judgment normal.            Assessment/Plan   Problem List Items Addressed This Visit          Circulatory    Hypertension associated with diabetes (CMS/[facility]) - Primary    Relevant Medications    semaglutide, weight loss, (Wegovy) 0.25 mg/0.5 mL pen injector    blood sugar diagnostic (Blood Glucose Test) strip    lancets misc    FreeStyle glucose monitoring kit       Endocrine/Metabolic    Class 3 severe obesity due to excess calories with serious comorbidity and body mass index (BMI) of 40.0 to 44.9 in adult (CMS/[facility])    Relevant Medications    semaglutide, weight loss, (Wegovy) 0.25 mg/0.5 mL pen injector    Vitamin D deficiency    Relevant Medications    ergocalciferol (Vitamin D-2) 1.25 MG (99529 UT) capsule    calcium carbonate-vitamin D3 (Hi-Luis Plus Vit D) 500 mg-5 mcg (200 unit) tablet       Hematologic    Low serum vitamin B12    Relevant Medications    cyanocobalamin (Vitamin B-12) injection 1,000 mcg     Other Visit Diagnoses       B12 deficiency        Relevant Medications    cyanocobalamin (Vitamin B-12) 100 mcg tablet    Left elbow pain        Relevant Medications    naproxen (Naprosyn) 500 mg tablet    predniSONE (Deltasone) 10 mg tablet            WE DISCUSSED MOST COMMON SIDE EFFECTS OF PRESCRIBED MEDICATIONS. INDICATIONS, RISK, COMPLICATIONS, AND ALTERNATIVES OF MEDICATION/THERAPEUTICS WERE EXPLAINED AND DISCUSSED. PLEASE MONITOR CLOSELY FOR ANY UNTOWARD SIDE EFFECTS OR COMPLICATIONS OF MEDICATIONS. PATIENT IS STRONGLY ADVISED TO BE COMPLIANT WITH RECOMMENDATIONS. QUESTIONS AND CONCERNS WERE ADDRESSED. INSTRUCTED TO CALL, RETURN SOONER, OR GO TO THE ER,  IF SYMPTOMS PERSIST OR WORSEN. THEY VOICED UNDERSTANDING AND  DENIES FURTHER QUESTIONS AT THIS TIME.    TIME CODE  1. PREPARATION FOR PATIENT'S VISIT (REVIEWING CHART, CURRENT MEDICAL RECORDS, OUTSIDE HEALTH PROVIDER RECORDS, PREVIOUS HISTORY, EXAM, TEST, PROCEDURE, AND MEDICATIONS)  2. FACE TO FACE  ENCOUNTER OBTAINING HISTORY FROM THE PATIENT/FAMILY/CAREGIVERS; PERFORMING EVALUATION AND EXAMINATION; ORDERING TESTS OR PROCEDURES; REFERRING AND COMMUNICATING WITH OTHER HEALTHCARE PROVIDERS; COUNSELING AND EDUCATION OF THE PATIENT/FAMILY/CAREGIVERS; INDEPENDENTLY INTERPRETING RESULTS (TESTS, LABS, PROCEDURES, IMAGING) AND COMMUNICATING AND EXPLAINING RESULTS TO THE PATIENT/FAMILY/CAREGIVERS  3. COORDINATION OF CARE; PREPARING AND PRINTING DISCHARGE INSTRUCTIONS AND ANY EDUCATIONAL MATERIAL FOR THE PATIENT/FAMILY/CAREGIVERS. DOCUMENTING CLINICAL INFORMATION IN THE ELECTRONIC MEDICAL RECORD   4. REVIEWING OARRS AS NEEDED    MDM  1) COMPLEXITY: MORE THAN 1 STABLE CHRONIC CONDITION ADDRESSED OR 1 ACUTE ILLNESS ADDRESSED   2)DATA: TESTS INTERPRETED AND OR ORDERED, TOOK INDEPENDENT HISTORY OR RECORDS REVIEWED  3)RISK: MODERATE RISK DUE TO NATURE OF MEDICAL CONDITIONS/COMORBIDITY OR MEDICATIONS ORDERED OR SURGICAL OR PROCEDURE REFERRAL    1 MONTH MED CHECK

## 2023-05-19 ENCOUNTER — TELEPHONE (OUTPATIENT)
Dept: PRIMARY CARE | Facility: CLINIC | Age: 48
End: 2023-05-19
Payer: MEDICAID

## 2023-05-19 DIAGNOSIS — I15.2 HYPERTENSION ASSOCIATED WITH DIABETES (MULTI): Primary | ICD-10-CM

## 2023-05-19 DIAGNOSIS — E11.59 HYPERTENSION ASSOCIATED WITH DIABETES (MULTI): Primary | ICD-10-CM

## 2023-05-19 RX ORDER — DULAGLUTIDE 0.75 MG/.5ML
0.75 INJECTION, SOLUTION SUBCUTANEOUS
Qty: 4 EACH | Refills: 2 | Status: SHIPPED | OUTPATIENT
Start: 2023-05-19 | End: 2023-06-16 | Stop reason: ALTCHOICE

## 2023-05-19 NOTE — TELEPHONE ENCOUNTER
Pt called and states Wegovy is on back order until the end of September , she would like a replacement. Please advice

## 2023-06-13 ENCOUNTER — APPOINTMENT (OUTPATIENT)
Dept: PRIMARY CARE | Facility: CLINIC | Age: 48
End: 2023-06-13
Payer: MEDICAID

## 2023-06-16 ENCOUNTER — OFFICE VISIT (OUTPATIENT)
Dept: PRIMARY CARE | Facility: CLINIC | Age: 48
End: 2023-06-16
Payer: MEDICAID

## 2023-06-16 VITALS
SYSTOLIC BLOOD PRESSURE: 128 MMHG | DIASTOLIC BLOOD PRESSURE: 81 MMHG | WEIGHT: 269 LBS | HEART RATE: 80 BPM | BODY MASS INDEX: 44.82 KG/M2 | HEIGHT: 65 IN

## 2023-06-16 DIAGNOSIS — E11.59 HYPERTENSION ASSOCIATED WITH DIABETES (MULTI): ICD-10-CM

## 2023-06-16 DIAGNOSIS — J45.30 MILD PERSISTENT ASTHMA, UNSPECIFIED WHETHER COMPLICATED (HHS-HCC): Primary | ICD-10-CM

## 2023-06-16 DIAGNOSIS — I15.2 HYPERTENSION ASSOCIATED WITH DIABETES (MULTI): ICD-10-CM

## 2023-06-16 DIAGNOSIS — E66.01 CLASS 3 SEVERE OBESITY DUE TO EXCESS CALORIES WITH SERIOUS COMORBIDITY AND BODY MASS INDEX (BMI) OF 40.0 TO 44.9 IN ADULT (MULTI): ICD-10-CM

## 2023-06-16 DIAGNOSIS — J30.2 SEASONAL ALLERGIES: ICD-10-CM

## 2023-06-16 PROCEDURE — 3008F BODY MASS INDEX DOCD: CPT | Performed by: NURSE PRACTITIONER

## 2023-06-16 PROCEDURE — 99214 OFFICE O/P EST MOD 30 MIN: CPT | Performed by: NURSE PRACTITIONER

## 2023-06-16 PROCEDURE — 3074F SYST BP LT 130 MM HG: CPT | Performed by: NURSE PRACTITIONER

## 2023-06-16 PROCEDURE — 3044F HG A1C LEVEL LT 7.0%: CPT | Performed by: NURSE PRACTITIONER

## 2023-06-16 PROCEDURE — 1036F TOBACCO NON-USER: CPT | Performed by: NURSE PRACTITIONER

## 2023-06-16 PROCEDURE — 3079F DIAST BP 80-89 MM HG: CPT | Performed by: NURSE PRACTITIONER

## 2023-06-16 RX ORDER — MONTELUKAST SODIUM 10 MG/1
10 TABLET ORAL NIGHTLY
Qty: 90 TABLET | Refills: 3 | Status: SHIPPED | OUTPATIENT
Start: 2023-06-16 | End: 2024-05-31

## 2023-06-16 RX ORDER — MOMETASONE FUROATE AND FORMOTEROL FUMARATE DIHYDRATE 200; 5 UG/1; UG/1
2 AEROSOL RESPIRATORY (INHALATION)
Qty: 13 G | Refills: 11 | Status: SHIPPED | OUTPATIENT
Start: 2023-06-16

## 2023-06-16 RX ORDER — TIRZEPATIDE 2.5 MG/.5ML
2.5 INJECTION, SOLUTION SUBCUTANEOUS
Qty: 2 ML | Refills: 3 | Status: SHIPPED | OUTPATIENT
Start: 2023-06-16 | End: 2023-06-16 | Stop reason: ALTCHOICE

## 2023-06-16 RX ORDER — LORATADINE 10 MG/1
10 TABLET ORAL DAILY
Qty: 90 TABLET | Refills: 3 | Status: SHIPPED | OUTPATIENT
Start: 2023-06-16 | End: 2024-06-15

## 2023-06-16 RX ORDER — FLUTICASONE PROPIONATE 50 MCG
2 SPRAY, SUSPENSION (ML) NASAL DAILY
Qty: 16 G | Refills: 11 | Status: SHIPPED | OUTPATIENT
Start: 2023-06-16

## 2023-06-16 RX ORDER — ALBUTEROL SULFATE 90 UG/1
1 AEROSOL, METERED RESPIRATORY (INHALATION) EVERY 4 HOURS PRN
Qty: 18 G | Refills: 3 | Status: SHIPPED | OUTPATIENT
Start: 2023-06-16

## 2023-06-16 ASSESSMENT — ENCOUNTER SYMPTOMS
PALPITATIONS: 0
FATIGUE: 0
BLOOD IN STOOL: 0
APNEA: 0
NERVOUS/ANXIOUS: 0
DYSURIA: 0
ABDOMINAL PAIN: 0
CHILLS: 0
HEMATURIA: 0
FREQUENCY: 0
SPEECH DIFFICULTY: 0
NAUSEA: 0
FEVER: 0
WOUND: 0
SEIZURES: 0
NECK PAIN: 0
SLEEP DISTURBANCE: 0
ABDOMINAL DISTENTION: 0
DIARRHEA: 0
BACK PAIN: 0
TROUBLE SWALLOWING: 0
PHOTOPHOBIA: 0
UNEXPECTED WEIGHT CHANGE: 0
FLANK PAIN: 0
CONSTIPATION: 0
JOINT SWELLING: 0
SORE THROAT: 0
CHOKING: 0
ARTHRALGIAS: 0
EYE PAIN: 0
SHORTNESS OF BREATH: 0
MYALGIAS: 0
EYE REDNESS: 0
DIZZINESS: 0
WEAKNESS: 0
FACIAL ASYMMETRY: 0
NUMBNESS: 0
HEADACHES: 0
COUGH: 0
CONFUSION: 0
VOMITING: 0
WHEEZING: 0
CHEST TIGHTNESS: 0
DIFFICULTY URINATING: 0

## 2023-06-16 ASSESSMENT — PATIENT HEALTH QUESTIONNAIRE - PHQ9
1. LITTLE INTEREST OR PLEASURE IN DOING THINGS: NOT AT ALL
SUM OF ALL RESPONSES TO PHQ9 QUESTIONS 1 AND 2: 0
2. FEELING DOWN, DEPRESSED OR HOPELESS: NOT AT ALL

## 2023-06-16 NOTE — TELEPHONE ENCOUNTER
RECEIVED FAX FROM [employer] STATING MOUNJARO 2.5MG/0,5ML REQUIRES A PRIOR AUTHORIZATION. I SUBMITTED PRIOR AUTH TO [employer] - WAITING FOR COVERAGE DETERMINATION.   KEY: [id number]

## 2023-06-16 NOTE — PROGRESS NOTES
"Subjective   Patient ID: Inge Vance is a 47 y.o. female who presents for Follow-up (1 MO MED CHECK).      HPI:  Presents today for 1 MONTH MONTH CHECK OF TRULICITY. SHE STATES SHE HAS HAD A MIGRAINE SINCE STARTING IT. NO NEW COMPLAINTS     DM- START MOUNJARO   ASTHMA- STABLE. RESTART SINGULAR  ALLERGIES- START LORATADINE    Visit Vitals  /81   Pulse 80   Ht 1.651 m (5' 5\")   Wt 122 kg (269 lb)   BMI 44.76 kg/m²   Smoking Status Never   BSA 2.37 m²        Review of Systems   Constitutional:  Negative for chills, fatigue, fever and unexpected weight change.   HENT:  Negative for congestion, ear pain, sore throat and trouble swallowing.    Eyes:  Negative for photophobia, pain, redness and visual disturbance.   Respiratory:  Negative for apnea, cough, choking, chest tightness, shortness of breath and wheezing.    Cardiovascular:  Negative for chest pain, palpitations and leg swelling.   Gastrointestinal:  Negative for abdominal distention, abdominal pain, blood in stool, constipation, diarrhea, nausea and vomiting.   Genitourinary:  Negative for difficulty urinating, dysuria, flank pain, frequency, hematuria and urgency.   Musculoskeletal:  Negative for arthralgias, back pain, gait problem, joint swelling, myalgias and neck pain.   Skin:  Negative for rash and wound.   Neurological:  Negative for dizziness, seizures, syncope, facial asymmetry, speech difficulty, weakness, numbness and headaches.   Psychiatric/Behavioral:  Negative for confusion, sleep disturbance and suicidal ideas. The patient is not nervous/anxious.        Objective     Physical Exam  Constitutional:       Appearance: Normal appearance. She is normal weight.   HENT:      Head: Normocephalic.   Eyes:      Extraocular Movements: Extraocular movements intact.      Conjunctiva/sclera: Conjunctivae normal.      Pupils: Pupils are equal, round, and reactive to light.   Cardiovascular:      Rate and Rhythm: Normal rate and regular rhythm.      " Pulses: Normal pulses.      Heart sounds: Normal heart sounds.   Pulmonary:      Effort: Pulmonary effort is normal.      Breath sounds: Normal breath sounds.   Musculoskeletal:         General: Normal range of motion.      Cervical back: Normal range of motion.   Skin:     General: Skin is warm and dry.   Neurological:      General: No focal deficit present.      Mental Status: She is alert and oriented to person, place, and time.   Psychiatric:         Mood and Affect: Mood normal.         Behavior: Behavior normal.         Thought Content: Thought content normal.         Judgment: Judgment normal.            Assessment/Plan   Problem List Items Addressed This Visit          Respiratory    Mild persistent asthma - Primary    Relevant Medications    mometasone-formoterol (Dulera) 200-5 mcg/actuation inhaler    albuterol 90 mcg/actuation inhaler    montelukast (Singulair) 10 mg tablet       Circulatory    Hypertension associated with diabetes (CMS/[facility])    Relevant Medications    tirzepatide (Mounjaro) 2.5 mg/0.5 mL pen injector       Endocrine/Metabolic    Class 3 severe obesity due to excess calories with serious comorbidity and body mass index (BMI) of 40.0 to 44.9 in adult (CMS/[facility])     Other Visit Diagnoses       Seasonal allergies        Relevant Medications    loratadine (Claritin) 10 mg tablet    fluticasone (Flonase) 50 mcg/actuation nasal spray            WE DISCUSSED MOST COMMON SIDE EFFECTS OF PRESCRIBED MEDICATIONS. INDICATIONS, RISK, COMPLICATIONS, AND ALTERNATIVES OF MEDICATION/THERAPEUTICS WERE EXPLAINED AND DISCUSSED. PLEASE MONITOR CLOSELY FOR ANY UNTOWARD SIDE EFFECTS OR COMPLICATIONS OF MEDICATIONS. PATIENT IS STRONGLY ADVISED TO BE COMPLIANT WITH RECOMMENDATIONS. QUESTIONS AND CONCERNS WERE ADDRESSED. INSTRUCTED TO CALL, RETURN SOONER, OR GO TO THE ER,  IF SYMPTOMS PERSIST OR WORSEN. THEY VOICED UNDERSTANDING AND  DENIES FURTHER QUESTIONS AT THIS TIME.    TIME CODE  1. PREPARATION FOR PATIENT'S  VISIT (REVIEWING CHART, CURRENT MEDICAL RECORDS, OUTSIDE HEALTH PROVIDER RECORDS, PREVIOUS HISTORY, EXAM, TEST, PROCEDURE, AND MEDICATIONS)  2. FACE TO FACE ENCOUNTER OBTAINING HISTORY FROM THE PATIENT/FAMILY/CAREGIVERS; PERFORMING EVALUATION AND EXAMINATION; ORDERING TESTS OR PROCEDURES; REFERRING AND COMMUNICATING WITH OTHER HEALTHCARE PROVIDERS; COUNSELING AND EDUCATION OF THE PATIENT/FAMILY/CAREGIVERS; INDEPENDENTLY INTERPRETING RESULTS (TESTS, LABS, PROCEDURES, IMAGING) AND COMMUNICATING AND EXPLAINING RESULTS TO THE PATIENT/FAMILY/CAREGIVERS  3. COORDINATION OF CARE; PREPARING AND PRINTING DISCHARGE INSTRUCTIONS AND ANY EDUCATIONAL MATERIAL FOR THE PATIENT/FAMILY/CAREGIVERS. DOCUMENTING CLINICAL INFORMATION IN THE ELECTRONIC MEDICAL RECORD   4. REVIEWING OARRS AS NEEDED    MDM  1) COMPLEXITY: MORE THAN 1 STABLE CHRONIC CONDITION ADDRESSED OR 1 ACUTE ILLNESS ADDRESSED   2)DATA: TESTS INTERPRETED AND OR ORDERED, TOOK INDEPENDENT HISTORY OR RECORDS REVIEWED  3)RISK: MODERATE RISK DUE TO NATURE OF MEDICAL CONDITIONS/COMORBIDITY OR MEDICATIONS ORDERED OR SURGICAL OR PROCEDURE REFERRAL    1 MONTH VIRTUAL MED CHECK

## 2023-06-16 NOTE — TELEPHONE ENCOUNTER
RECEIVED FAX FROM INSURANCE STATING MED HAS BEEN DENIED. PATIENT MUST TRY AT LEAST 3 PREFERRED MEDICATIONS:  ROBIN GUERRA  JARDIANCE  AT LEAST ONE OF THE THREE PREFERRED MEDICATIONS MUST BE:  TRULICITY  BYETTA  VICTOZA

## 2023-06-20 RX ORDER — PEN NEEDLE, DIABETIC 30 GX3/16"
NEEDLE, DISPOSABLE MISCELLANEOUS
Qty: 100 EACH | Refills: 3 | Status: SHIPPED | OUTPATIENT
Start: 2023-06-20 | End: 2024-06-19

## 2023-07-14 ENCOUNTER — TELEMEDICINE (OUTPATIENT)
Dept: PRIMARY CARE | Facility: CLINIC | Age: 48
End: 2023-07-14
Payer: MEDICAID

## 2023-07-14 ENCOUNTER — TELEPHONE (OUTPATIENT)
Dept: PRIMARY CARE | Facility: CLINIC | Age: 48
End: 2023-07-14

## 2023-07-14 DIAGNOSIS — I15.2 HYPERTENSION ASSOCIATED WITH DIABETES (MULTI): Primary | ICD-10-CM

## 2023-07-14 DIAGNOSIS — R51.9 CHRONIC NONINTRACTABLE HEADACHE, UNSPECIFIED HEADACHE TYPE: ICD-10-CM

## 2023-07-14 DIAGNOSIS — E11.59 HYPERTENSION ASSOCIATED WITH DIABETES (MULTI): Primary | ICD-10-CM

## 2023-07-14 DIAGNOSIS — G89.29 CHRONIC NONINTRACTABLE HEADACHE, UNSPECIFIED HEADACHE TYPE: ICD-10-CM

## 2023-07-14 PROCEDURE — 99214 OFFICE O/P EST MOD 30 MIN: CPT | Performed by: NURSE PRACTITIONER

## 2023-07-14 RX ORDER — TOPIRAMATE 100 MG/1
100 TABLET, FILM COATED ORAL 2 TIMES DAILY
Qty: 135 TABLET | Refills: 1 | Status: SHIPPED | OUTPATIENT
Start: 2023-07-14 | End: 2024-01-05

## 2023-07-14 RX ORDER — TIRZEPATIDE 2.5 MG/.5ML
2.5 INJECTION, SOLUTION SUBCUTANEOUS
Qty: 2 ML | Refills: 2 | Status: SHIPPED | OUTPATIENT
Start: 2023-07-14 | End: 2023-08-30 | Stop reason: ALTCHOICE

## 2023-07-14 ASSESSMENT — ENCOUNTER SYMPTOMS
ARTHRALGIAS: 0
CONFUSION: 0
DIZZINESS: 0
NERVOUS/ANXIOUS: 0
SHORTNESS OF BREATH: 0
PHOTOPHOBIA: 0
FEVER: 0
HEADACHES: 0
APNEA: 0
CHILLS: 0
WOUND: 0
MYALGIAS: 0
DYSURIA: 0
ABDOMINAL DISTENTION: 0
UNEXPECTED WEIGHT CHANGE: 0
FATIGUE: 0
ABDOMINAL PAIN: 0
NECK PAIN: 0
SPEECH DIFFICULTY: 0
HEMATURIA: 0
COUGH: 0
PALPITATIONS: 0
WHEEZING: 0
FLANK PAIN: 0
FACIAL ASYMMETRY: 0
BLOOD IN STOOL: 0
NAUSEA: 0
EYE PAIN: 0
BACK PAIN: 0
CHEST TIGHTNESS: 0
SEIZURES: 0
SLEEP DISTURBANCE: 0
FREQUENCY: 0
DIARRHEA: 0
TROUBLE SWALLOWING: 0
SORE THROAT: 0
EYE REDNESS: 0
NUMBNESS: 0
VOMITING: 0
CONSTIPATION: 0
CHOKING: 0
DIFFICULTY URINATING: 0
JOINT SWELLING: 0
WEAKNESS: 0

## 2023-07-14 NOTE — PROGRESS NOTES
Subjective   Patient ID: Inge Vance is a 47 y.o. female who presents for virtual visit  (1 mnth med ck ).    VIRTUAL APPOINTMENT BEING PERFORMED DUE TO COVID-19 (CORONAVIRUS)    HPI:  Presents today for 1 MONTH MED CHECK OF VICTOZA. SHE HAS GAIN WEIGHT ON IT AND HAS INCREASED URINATION. NO NEW COMPLAINTS       DM- HAS BEEN ON JARDIANCE, TRULICITY, AND VICTOZIA. WILL START MOUNJARO       Visit Vitals  Smoking Status Never        Review of Systems   Constitutional:  Negative for chills, fatigue, fever and unexpected weight change.   HENT:  Negative for congestion, ear pain, sore throat and trouble swallowing.    Eyes:  Negative for photophobia, pain, redness and visual disturbance.   Respiratory:  Negative for apnea, cough, choking, chest tightness, shortness of breath and wheezing.    Cardiovascular:  Negative for chest pain, palpitations and leg swelling.   Gastrointestinal:  Negative for abdominal distention, abdominal pain, blood in stool, constipation, diarrhea, nausea and vomiting.   Genitourinary:  Negative for difficulty urinating, dysuria, flank pain, frequency, hematuria and urgency.   Musculoskeletal:  Negative for arthralgias, back pain, gait problem, joint swelling, myalgias and neck pain.   Skin:  Negative for rash and wound.   Neurological:  Negative for dizziness, seizures, syncope, facial asymmetry, speech difficulty, weakness, numbness and headaches.   Psychiatric/Behavioral:  Negative for confusion, sleep disturbance and suicidal ideas. The patient is not nervous/anxious.        Objective     Physical Exam  Neurological:      Mental Status: She is alert.   Psychiatric:         Mood and Affect: Mood normal.         Behavior: Behavior normal.         Thought Content: Thought content normal.         Judgment: Judgment normal.            Assessment/Plan   Problem List Items Addressed This Visit       Hypertension associated with diabetes (CMS/HCC) - Primary    Relevant Medications    tirzepatide  (Mounjaro) 2.5 mg/0.5 mL pen injector    Chronic nonintractable headache    Relevant Medications    topiramate (Topamax) 100 mg tablet       WE DISCUSSED MOST COMMON SIDE EFFECTS OF PRESCRIBED MEDICATIONS. INDICATIONS, RISK, COMPLICATIONS, AND ALTERNATIVES OF MEDICATION/THERAPEUTICS WERE EXPLAINED AND DISCUSSED. PLEASE MONITOR CLOSELY FOR ANY UNTOWARD SIDE EFFECTS OR COMPLICATIONS OF MEDICATIONS. PATIENT IS STRONGLY ADVISED TO BE COMPLIANT WITH RECOMMENDATIONS. QUESTIONS AND CONCERNS WERE ADDRESSED. INSTRUCTED TO CALL, RETURN SOONER, OR GO TO THE ER,  IF SYMPTOMS PERSIST OR WORSEN. THEY VOICED UNDERSTANDING AND  DENIES FURTHER QUESTIONS AT THIS TIME.    TIME CODE  1. PREPARATION FOR PATIENT'S VISIT (REVIEWING CHART, CURRENT MEDICAL RECORDS, OUTSIDE HEALTH PROVIDER RECORDS, PREVIOUS HISTORY, EXAM, TEST, PROCEDURE, AND MEDICATIONS)  2. FACE TO FACE ENCOUNTER OBTAINING HISTORY FROM THE PATIENT/FAMILY/CAREGIVERS; PERFORMING EVALUATION AND EXAMINATION; ORDERING TESTS OR PROCEDURES; REFERRING AND COMMUNICATING WITH OTHER HEALTHCARE PROVIDERS; COUNSELING AND EDUCATION OF THE PATIENT/FAMILY/CAREGIVERS; INDEPENDENTLY INTERPRETING RESULTS (TESTS, LABS, PROCEDURES, IMAGING) AND COMMUNICATING AND EXPLAINING RESULTS TO THE PATIENT/FAMILY/CAREGIVERS  3. COORDINATION OF CARE; PREPARING AND PRINTING DISCHARGE INSTRUCTIONS AND ANY EDUCATIONAL MATERIAL FOR THE PATIENT/FAMILY/CAREGIVERS. DOCUMENTING CLINICAL INFORMATION IN THE ELECTRONIC MEDICAL RECORD   4. REVIEWING OARRS AS NEEDED    MDM  1) COMPLEXITY: MORE THAN 1 STABLE CHRONIC CONDITION ADDRESSED OR 1 ACUTE ILLNESS ADDRESSED   2)DATA: TESTS INTERPRETED AND OR ORDERED, TOOK INDEPENDENT HISTORY OR RECORDS REVIEWED  3)RISK: MODERATE RISK DUE TO NATURE OF MEDICAL CONDITIONS/COMORBIDITY OR MEDICATIONS ORDERED OR SURGICAL OR PROCEDURE REFERRAL    1 MONTH MED CHECK

## 2023-08-18 ENCOUNTER — TELEPHONE (OUTPATIENT)
Dept: PRIMARY CARE | Facility: CLINIC | Age: 48
End: 2023-08-18

## 2023-08-18 DIAGNOSIS — I15.2 HYPERTENSION ASSOCIATED WITH DIABETES (MULTI): Primary | ICD-10-CM

## 2023-08-18 DIAGNOSIS — E11.59 HYPERTENSION ASSOCIATED WITH DIABETES (MULTI): Primary | ICD-10-CM

## 2023-08-30 ENCOUNTER — TELEPHONE (OUTPATIENT)
Dept: PRIMARY CARE | Facility: CLINIC | Age: 48
End: 2023-08-30
Payer: MEDICAID

## 2023-08-30 RX ORDER — SEMAGLUTIDE 0.25 MG/.5ML
0.25 INJECTION, SOLUTION SUBCUTANEOUS
Qty: 2 ML | Refills: 0 | Status: SHIPPED | OUTPATIENT
Start: 2023-08-30 | End: 2023-09-22 | Stop reason: ALTCHOICE

## 2023-08-30 NOTE — TELEPHONE ENCOUNTER
RECEIVED FAX FROM [employer] STATING JOSE MANUEL REQUIRES A PRIOR AUTH. I SUBMITTED PRIOR AUTH TO [employer] - WAITING FOR COVERAGE DETERMINATION.   KEY: [id number]

## 2023-08-30 NOTE — TELEPHONE ENCOUNTER
PATIENT CALLED STATING SHE RECEIVED NOTICE THAT APPEAL FOR MOUNJARO WAS DENIED BY INSURANCE. PATIENT QUESTIONS WHAT SHE SHOULD DO NOW? DO YOU WANT HER TO KEEP HER UPCOMING APPOINTMENT TO FURTHER DISCUSS?

## 2023-08-31 NOTE — TELEPHONE ENCOUNTER
PATIENT NOTIFIED. SHE HAS ALREADY CONTACTED HER INSURANCE RECENTLY AND THEY WEREN'T HELPFUL AT ALL WITH GIVING INFORMATION ON WHAT MAY BE COVERED. SHE HAS A VIRTUAL APPOINTMENT WITH [facility]AGER ON 9/8/23 AND PLANS TO FURTHER DISCUSS AT THAT TIME.

## 2023-09-21 RX ORDER — TIRZEPATIDE 2.5 MG/.5ML
INJECTION, SOLUTION SUBCUTANEOUS
COMMUNITY
Start: 2023-08-30 | End: 2023-09-22

## 2023-09-22 ENCOUNTER — OFFICE VISIT (OUTPATIENT)
Dept: PRIMARY CARE | Facility: CLINIC | Age: 48
End: 2023-09-22
Payer: MEDICAID

## 2023-09-22 VITALS
DIASTOLIC BLOOD PRESSURE: 88 MMHG | SYSTOLIC BLOOD PRESSURE: 136 MMHG | BODY MASS INDEX: 46.32 KG/M2 | HEIGHT: 65 IN | WEIGHT: 278 LBS | OXYGEN SATURATION: 96 % | HEART RATE: 76 BPM

## 2023-09-22 DIAGNOSIS — E11.59 HYPERTENSION ASSOCIATED WITH DIABETES (MULTI): ICD-10-CM

## 2023-09-22 DIAGNOSIS — N32.81 OVERACTIVE BLADDER: Primary | ICD-10-CM

## 2023-09-22 DIAGNOSIS — E66.01 CLASS 3 SEVERE OBESITY DUE TO EXCESS CALORIES WITH SERIOUS COMORBIDITY AND BODY MASS INDEX (BMI) OF 45.0 TO 49.9 IN ADULT (MULTI): ICD-10-CM

## 2023-09-22 DIAGNOSIS — I15.2 HYPERTENSION ASSOCIATED WITH DIABETES (MULTI): ICD-10-CM

## 2023-09-22 PROCEDURE — 99214 OFFICE O/P EST MOD 30 MIN: CPT | Performed by: NURSE PRACTITIONER

## 2023-09-22 PROCEDURE — 3044F HG A1C LEVEL LT 7.0%: CPT | Performed by: NURSE PRACTITIONER

## 2023-09-22 PROCEDURE — 3075F SYST BP GE 130 - 139MM HG: CPT | Performed by: NURSE PRACTITIONER

## 2023-09-22 PROCEDURE — 3079F DIAST BP 80-89 MM HG: CPT | Performed by: NURSE PRACTITIONER

## 2023-09-22 PROCEDURE — 3008F BODY MASS INDEX DOCD: CPT | Performed by: NURSE PRACTITIONER

## 2023-09-22 PROCEDURE — 1036F TOBACCO NON-USER: CPT | Performed by: NURSE PRACTITIONER

## 2023-09-22 RX ORDER — MIRABEGRON 25 MG/1
25 TABLET, FILM COATED, EXTENDED RELEASE ORAL DAILY
Qty: 90 TABLET | Refills: 0 | Status: SHIPPED | OUTPATIENT
Start: 2023-09-22

## 2023-09-22 ASSESSMENT — ENCOUNTER SYMPTOMS
CONFUSION: 0
MYALGIAS: 0
EYE PAIN: 0
NECK PAIN: 0
BACK PAIN: 0
DYSURIA: 0
HEMATURIA: 0
CONSTIPATION: 0
APNEA: 0
ABDOMINAL DISTENTION: 0
SORE THROAT: 0
VOMITING: 0
PALPITATIONS: 0
COUGH: 0
EYE REDNESS: 0
CHEST TIGHTNESS: 0
SLEEP DISTURBANCE: 0
WEAKNESS: 0
FLANK PAIN: 0
DIZZINESS: 0
ARTHRALGIAS: 0
SHORTNESS OF BREATH: 0
FATIGUE: 0
DIFFICULTY URINATING: 0
JOINT SWELLING: 0
CHOKING: 0
FREQUENCY: 0
FACIAL ASYMMETRY: 0
CHILLS: 0
WHEEZING: 0
DIARRHEA: 0
SEIZURES: 0
ABDOMINAL PAIN: 0
NERVOUS/ANXIOUS: 0
BLOOD IN STOOL: 0
WOUND: 0
SPEECH DIFFICULTY: 0
FEVER: 0
NUMBNESS: 0
TROUBLE SWALLOWING: 0
HEADACHES: 0
NAUSEA: 0
PHOTOPHOBIA: 0
UNEXPECTED WEIGHT CHANGE: 0

## 2023-09-22 NOTE — PROGRESS NOTES
"Subjective   Patient ID: Inge Vance is a 48 y.o. female who presents for Follow-up (1 MO MED CHECK ) and Urinary Frequency.      HPI:  Presents today for 1 MONTH MED CHECK. SHE DID NOT GET THE WEGOVY. C/O URINARY FREQUENCY X SEVERAL YEARS   modifying factors consists of HAS OVER ACTIVE BLADDER  associated symptoms consist of WEARS DEPENDS  prior treatment consists of medication OXYBUTYNIN IN THE PAST       HTN- STABLE  BMI- INSURANCE WILL NOT COVER INJECTABLES. WILL TRY ADIPEX     Visit Vitals  /88 (BP Location: Right arm, Patient Position: Sitting)   Pulse 76   Ht 1.651 m (5' 5\")   Wt 126 kg (278 lb)   SpO2 96%   BMI 46.26 kg/m²   OB Status Hysterectomy   Smoking Status Never   BSA 2.4 m²        Review of Systems   Constitutional:  Negative for chills, fatigue, fever and unexpected weight change.   HENT:  Negative for congestion, ear pain, sore throat and trouble swallowing.    Eyes:  Negative for photophobia, pain, redness and visual disturbance.   Respiratory:  Negative for apnea, cough, choking, chest tightness, shortness of breath and wheezing.    Cardiovascular:  Negative for chest pain, palpitations and leg swelling.   Gastrointestinal:  Negative for abdominal distention, abdominal pain, blood in stool, constipation, diarrhea, nausea and vomiting.   Genitourinary:  Negative for difficulty urinating, dysuria, flank pain, frequency, hematuria and urgency.   Musculoskeletal:  Negative for arthralgias, back pain, gait problem, joint swelling, myalgias and neck pain.   Skin:  Negative for rash and wound.   Neurological:  Negative for dizziness, seizures, syncope, facial asymmetry, speech difficulty, weakness, numbness and headaches.   Psychiatric/Behavioral:  Negative for confusion, sleep disturbance and suicidal ideas. The patient is not nervous/anxious.        Objective     Physical Exam  Constitutional:       Appearance: Normal appearance. She is normal weight.   HENT:      Head: Normocephalic. "   Eyes:      Extraocular Movements: Extraocular movements intact.      Conjunctiva/sclera: Conjunctivae normal.      Pupils: Pupils are equal, round, and reactive to light.   Cardiovascular:      Rate and Rhythm: Normal rate and regular rhythm.      Pulses: Normal pulses.      Heart sounds: Normal heart sounds.   Pulmonary:      Effort: Pulmonary effort is normal.      Breath sounds: Normal breath sounds.   Musculoskeletal:         General: Normal range of motion.      Cervical back: Normal range of motion.   Skin:     General: Skin is warm and dry.   Neurological:      General: No focal deficit present.      Mental Status: She is alert and oriented to person, place, and time.   Psychiatric:         Mood and Affect: Mood normal.         Behavior: Behavior normal.         Thought Content: Thought content normal.         Judgment: Judgment normal.            Assessment/Plan   Problem List Items Addressed This Visit       Overactive bladder - Primary    Relevant Medications    mirabegron (Myrbetriq) 25 mg tablet extended release 24 hr 24 hr tablet    Other Relevant Orders    Referral to Physical Therapy       WE DISCUSSED MOST COMMON SIDE EFFECTS OF PRESCRIBED MEDICATIONS. INDICATIONS, RISK, COMPLICATIONS, AND ALTERNATIVES OF MEDICATION/THERAPEUTICS WERE EXPLAINED AND DISCUSSED. PLEASE MONITOR CLOSELY FOR ANY UNTOWARD SIDE EFFECTS OR COMPLICATIONS OF MEDICATIONS. PATIENT IS STRONGLY ADVISED TO BE COMPLIANT WITH RECOMMENDATIONS. QUESTIONS AND CONCERNS WERE ADDRESSED. INSTRUCTED TO CALL, RETURN SOONER, OR GO TO THE ER,  IF SYMPTOMS PERSIST OR WORSEN. THEY VOICED UNDERSTANDING AND  DENIES FURTHER QUESTIONS AT THIS TIME.    TIME CODE  1. PREPARATION FOR PATIENT'S VISIT (REVIEWING CHART, CURRENT MEDICAL RECORDS, OUTSIDE HEALTH PROVIDER RECORDS, PREVIOUS HISTORY, EXAM, TEST, PROCEDURE, AND MEDICATIONS)  2. FACE TO FACE ENCOUNTER OBTAINING HISTORY FROM THE PATIENT/FAMILY/CAREGIVERS; PERFORMING EVALUATION AND EXAMINATION;  ORDERING TESTS OR PROCEDURES; REFERRING AND COMMUNICATING WITH OTHER HEALTHCARE PROVIDERS; COUNSELING AND EDUCATION OF THE PATIENT/FAMILY/CAREGIVERS; INDEPENDENTLY INTERPRETING RESULTS (TESTS, LABS, PROCEDURES, IMAGING) AND COMMUNICATING AND EXPLAINING RESULTS TO THE PATIENT/FAMILY/CAREGIVERS  3. COORDINATION OF CARE; PREPARING AND PRINTING DISCHARGE INSTRUCTIONS AND ANY EDUCATIONAL MATERIAL FOR THE PATIENT/FAMILY/CAREGIVERS. DOCUMENTING CLINICAL INFORMATION IN THE ELECTRONIC MEDICAL RECORD   4. REVIEWING OARRS AS NEEDED    MDM  1) COMPLEXITY: MORE THAN 1 STABLE CHRONIC CONDITION ADDRESSED OR 1 ACUTE ILLNESS ADDRESSED   2)DATA: TESTS INTERPRETED AND OR ORDERED, TOOK INDEPENDENT HISTORY OR RECORDS REVIEWED  3)RISK: MODERATE RISK DUE TO NATURE OF MEDICAL CONDITIONS/COMORBIDITY OR MEDICATIONS ORDERED OR SURGICAL OR PROCEDURE REFERRAL    MMT FOR ADIPEX

## 2023-10-09 ENCOUNTER — APPOINTMENT (OUTPATIENT)
Dept: PRIMARY CARE | Facility: CLINIC | Age: 48
End: 2023-10-09
Payer: MEDICAID

## 2023-11-08 ENCOUNTER — APPOINTMENT (OUTPATIENT)
Dept: CARDIOLOGY | Facility: CLINIC | Age: 48
End: 2023-11-08
Payer: MEDICAID

## 2023-12-01 ENCOUNTER — APPOINTMENT (OUTPATIENT)
Dept: PRIMARY CARE | Facility: CLINIC | Age: 48
End: 2023-12-01
Payer: MEDICAID

## 2024-01-05 DIAGNOSIS — G89.29 CHRONIC NONINTRACTABLE HEADACHE, UNSPECIFIED HEADACHE TYPE: ICD-10-CM

## 2024-01-05 DIAGNOSIS — R51.9 CHRONIC NONINTRACTABLE HEADACHE, UNSPECIFIED HEADACHE TYPE: ICD-10-CM

## 2024-01-05 RX ORDER — TOPIRAMATE 100 MG/1
100 TABLET, FILM COATED ORAL 2 TIMES DAILY
Qty: 180 TABLET | Refills: 1 | Status: SHIPPED | OUTPATIENT
Start: 2024-01-05 | End: 2024-06-07

## 2024-01-05 NOTE — TELEPHONE ENCOUNTER
PATIENT DOESN'T HAVE FUTURE APPOINTMENT SCHEDULED. PLEASE ADVISE WHEN YOU WOULD LIKE TO SEE HER BACK.

## 2024-03-14 ENCOUNTER — TELEPHONE (OUTPATIENT)
Dept: PRIMARY CARE | Facility: CLINIC | Age: 49
End: 2024-03-14
Payer: MEDICAID

## 2024-03-14 DIAGNOSIS — R05.1 ACUTE COUGH: Primary | ICD-10-CM

## 2024-03-14 RX ORDER — BENZONATATE 100 MG/1
100 CAPSULE ORAL 3 TIMES DAILY PRN
Qty: 42 CAPSULE | Refills: 0 | Status: SHIPPED | OUTPATIENT
Start: 2024-03-14 | End: 2024-04-13

## 2024-03-14 RX ORDER — TRAMADOL HYDROCHLORIDE 50 MG/1
50 TABLET ORAL EVERY 6 HOURS PRN
COMMUNITY
Start: 2023-12-21

## 2024-03-14 NOTE — TELEPHONE ENCOUNTER
Patient left a message and states she is having a coughing spell and would like something sent to pharmacy if possible. Please advise

## 2024-03-16 ENCOUNTER — HOSPITAL ENCOUNTER (EMERGENCY)
Facility: HOSPITAL | Age: 49
Discharge: HOME | End: 2024-03-16
Attending: EMERGENCY MEDICINE
Payer: MEDICAID

## 2024-03-16 ENCOUNTER — HOSPITAL ENCOUNTER (OUTPATIENT)
Dept: CARDIOLOGY | Facility: HOSPITAL | Age: 49
Discharge: HOME | End: 2024-03-16
Payer: MEDICAID

## 2024-03-16 ENCOUNTER — APPOINTMENT (OUTPATIENT)
Dept: RADIOLOGY | Facility: HOSPITAL | Age: 49
End: 2024-03-16
Payer: MEDICAID

## 2024-03-16 VITALS
OXYGEN SATURATION: 97 % | HEIGHT: 65 IN | BODY MASS INDEX: 44.82 KG/M2 | HEART RATE: 81 BPM | DIASTOLIC BLOOD PRESSURE: 85 MMHG | RESPIRATION RATE: 17 BRPM | SYSTOLIC BLOOD PRESSURE: 95 MMHG | TEMPERATURE: 96.5 F | WEIGHT: 269 LBS

## 2024-03-16 DIAGNOSIS — I48.91 ATRIAL FIBRILLATION WITH RVR (MULTI): Primary | ICD-10-CM

## 2024-03-16 LAB
ALBUMIN SERPL BCP-MCNC: 4.2 G/DL (ref 3.4–5)
ALP SERPL-CCNC: 63 U/L (ref 33–110)
ALT SERPL W P-5'-P-CCNC: 27 U/L (ref 7–45)
ANION GAP SERPL CALC-SCNC: 13 MMOL/L (ref 10–20)
APPEARANCE UR: ABNORMAL
AST SERPL W P-5'-P-CCNC: 23 U/L (ref 9–39)
BASOPHILS # BLD AUTO: 0.06 X10*3/UL (ref 0–0.1)
BASOPHILS NFR BLD AUTO: 0.7 %
BILIRUB SERPL-MCNC: 0.5 MG/DL (ref 0–1.2)
BILIRUB UR STRIP.AUTO-MCNC: ABNORMAL MG/DL
BNP SERPL-MCNC: 379 PG/ML (ref 0–99)
BUN SERPL-MCNC: 12 MG/DL (ref 6–23)
CALCIUM SERPL-MCNC: 9 MG/DL (ref 8.6–10.3)
CARDIAC TROPONIN I PNL SERPL HS: 5 NG/L (ref 0–13)
CHLORIDE SERPL-SCNC: 109 MMOL/L (ref 98–107)
CO2 SERPL-SCNC: 20 MMOL/L (ref 21–32)
COLOR UR: ABNORMAL
CREAT SERPL-MCNC: 0.8 MG/DL (ref 0.5–1.05)
EGFRCR SERPLBLD CKD-EPI 2021: 90 ML/MIN/1.73M*2
EOSINOPHIL # BLD AUTO: 0.48 X10*3/UL (ref 0–0.7)
EOSINOPHIL NFR BLD AUTO: 5.7 %
ERYTHROCYTE [DISTWIDTH] IN BLOOD BY AUTOMATED COUNT: 12.4 % (ref 11.5–14.5)
FLUAV RNA RESP QL NAA+PROBE: NOT DETECTED
FLUBV RNA RESP QL NAA+PROBE: NOT DETECTED
GLUCOSE SERPL-MCNC: 109 MG/DL (ref 74–99)
GLUCOSE UR STRIP.AUTO-MCNC: NEGATIVE MG/DL
HCT VFR BLD AUTO: 42.7 % (ref 36–46)
HGB BLD-MCNC: 14.1 G/DL (ref 12–16)
HOLD SPECIMEN: NORMAL
HYALINE CASTS #/AREA URNS AUTO: ABNORMAL /LPF
IMM GRANULOCYTES # BLD AUTO: 0.04 X10*3/UL (ref 0–0.7)
IMM GRANULOCYTES NFR BLD AUTO: 0.5 % (ref 0–0.9)
KETONES UR STRIP.AUTO-MCNC: NEGATIVE MG/DL
LEUKOCYTE ESTERASE UR QL STRIP.AUTO: ABNORMAL
LYMPHOCYTES # BLD AUTO: 2.57 X10*3/UL (ref 1.2–4.8)
LYMPHOCYTES NFR BLD AUTO: 30.3 %
MCH RBC QN AUTO: 31.3 PG (ref 26–34)
MCHC RBC AUTO-ENTMCNC: 33 G/DL (ref 32–36)
MCV RBC AUTO: 95 FL (ref 80–100)
MONOCYTES # BLD AUTO: 0.63 X10*3/UL (ref 0.1–1)
MONOCYTES NFR BLD AUTO: 7.4 %
NEUTROPHILS # BLD AUTO: 4.71 X10*3/UL (ref 1.2–7.7)
NEUTROPHILS NFR BLD AUTO: 55.4 %
NITRITE UR QL STRIP.AUTO: NEGATIVE
NRBC BLD-RTO: 0 /100 WBCS (ref 0–0)
PH UR STRIP.AUTO: 5 [PH]
PLATELET # BLD AUTO: 379 X10*3/UL (ref 150–450)
POTASSIUM SERPL-SCNC: 3.7 MMOL/L (ref 3.5–5.3)
PROT SERPL-MCNC: 6.8 G/DL (ref 6.4–8.2)
PROT UR STRIP.AUTO-MCNC: ABNORMAL MG/DL
RBC # BLD AUTO: 4.51 X10*6/UL (ref 4–5.2)
RBC # UR STRIP.AUTO: ABNORMAL /UL
RBC #/AREA URNS AUTO: ABNORMAL /HPF
SARS-COV-2 RNA RESP QL NAA+PROBE: NOT DETECTED
SODIUM SERPL-SCNC: 138 MMOL/L (ref 136–145)
SP GR UR STRIP.AUTO: 1.04
SQUAMOUS #/AREA URNS AUTO: ABNORMAL /HPF
UROBILINOGEN UR STRIP.AUTO-MCNC: <2 MG/DL
WBC # BLD AUTO: 8.5 X10*3/UL (ref 4.4–11.3)
WBC #/AREA URNS AUTO: ABNORMAL /HPF

## 2024-03-16 PROCEDURE — 93005 ELECTROCARDIOGRAM TRACING: CPT

## 2024-03-16 PROCEDURE — 99285 EMERGENCY DEPT VISIT HI MDM: CPT | Mod: 25

## 2024-03-16 PROCEDURE — 71275 CT ANGIOGRAPHY CHEST: CPT

## 2024-03-16 PROCEDURE — 2500000001 HC RX 250 WO HCPCS SELF ADMINISTERED DRUGS (ALT 637 FOR MEDICARE OP): Performed by: EMERGENCY MEDICINE

## 2024-03-16 PROCEDURE — 80053 COMPREHEN METABOLIC PANEL: CPT | Performed by: EMERGENCY MEDICINE

## 2024-03-16 PROCEDURE — 99291 CRITICAL CARE FIRST HOUR: CPT | Performed by: EMERGENCY MEDICINE

## 2024-03-16 PROCEDURE — 71275 CT ANGIOGRAPHY CHEST: CPT | Performed by: RADIOLOGY

## 2024-03-16 PROCEDURE — 81001 URINALYSIS AUTO W/SCOPE: CPT | Performed by: EMERGENCY MEDICINE

## 2024-03-16 PROCEDURE — 87636 SARSCOV2 & INF A&B AMP PRB: CPT | Performed by: EMERGENCY MEDICINE

## 2024-03-16 PROCEDURE — 2500000005 HC RX 250 GENERAL PHARMACY W/O HCPCS: Performed by: EMERGENCY MEDICINE

## 2024-03-16 PROCEDURE — 83880 ASSAY OF NATRIURETIC PEPTIDE: CPT | Performed by: EMERGENCY MEDICINE

## 2024-03-16 PROCEDURE — 85025 COMPLETE CBC W/AUTO DIFF WBC: CPT | Performed by: EMERGENCY MEDICINE

## 2024-03-16 PROCEDURE — 87086 URINE CULTURE/COLONY COUNT: CPT | Mod: SAMLAB | Performed by: EMERGENCY MEDICINE

## 2024-03-16 PROCEDURE — 84484 ASSAY OF TROPONIN QUANT: CPT | Performed by: EMERGENCY MEDICINE

## 2024-03-16 PROCEDURE — 36415 COLL VENOUS BLD VENIPUNCTURE: CPT | Performed by: EMERGENCY MEDICINE

## 2024-03-16 PROCEDURE — 96374 THER/PROPH/DIAG INJ IV PUSH: CPT | Mod: 59

## 2024-03-16 PROCEDURE — 2550000001 HC RX 255 CONTRASTS: Performed by: EMERGENCY MEDICINE

## 2024-03-16 RX ORDER — DILTIAZEM HYDROCHLORIDE 5 MG/ML
20 INJECTION INTRAVENOUS ONCE
Status: COMPLETED | OUTPATIENT
Start: 2024-03-16 | End: 2024-03-16

## 2024-03-16 RX ORDER — METOPROLOL TARTRATE 25 MG/1
25 TABLET, FILM COATED ORAL ONCE
Status: COMPLETED | OUTPATIENT
Start: 2024-03-16 | End: 2024-03-16

## 2024-03-16 RX ORDER — METOPROLOL TARTRATE 25 MG/1
25 TABLET, FILM COATED ORAL 2 TIMES DAILY
Qty: 60 TABLET | Refills: 0 | Status: SHIPPED | OUTPATIENT
Start: 2024-03-16 | End: 2025-03-16

## 2024-03-16 RX ORDER — ACETAMINOPHEN 325 MG/1
975 TABLET ORAL ONCE
Status: COMPLETED | OUTPATIENT
Start: 2024-03-16 | End: 2024-03-16

## 2024-03-16 RX ADMIN — DILTIAZEM HYDROCHLORIDE 20 MG: 5 INJECTION, SOLUTION INTRAVENOUS at 09:17

## 2024-03-16 RX ADMIN — ACETAMINOPHEN 975 MG: 325 TABLET ORAL at 09:37

## 2024-03-16 RX ADMIN — IOHEXOL 73 ML: 350 INJECTION, SOLUTION INTRAVENOUS at 10:29

## 2024-03-16 RX ADMIN — METOPROLOL TARTRATE 25 MG: 25 TABLET, FILM COATED ORAL at 09:37

## 2024-03-16 ASSESSMENT — COLUMBIA-SUICIDE SEVERITY RATING SCALE - C-SSRS
1. IN THE PAST MONTH, HAVE YOU WISHED YOU WERE DEAD OR WISHED YOU COULD GO TO SLEEP AND NOT WAKE UP?: NO
6. HAVE YOU EVER DONE ANYTHING, STARTED TO DO ANYTHING, OR PREPARED TO DO ANYTHING TO END YOUR LIFE?: NO
2. HAVE YOU ACTUALLY HAD ANY THOUGHTS OF KILLING YOURSELF?: NO

## 2024-03-16 ASSESSMENT — PAIN DESCRIPTION - LOCATION
LOCATION: BACK
LOCATION: HEAD

## 2024-03-16 ASSESSMENT — PAIN SCALES - GENERAL
PAINLEVEL_OUTOF10: 5 - MODERATE PAIN
PAINLEVEL_OUTOF10: 4
PAINLEVEL_OUTOF10: 4

## 2024-03-16 ASSESSMENT — PAIN - FUNCTIONAL ASSESSMENT: PAIN_FUNCTIONAL_ASSESSMENT: 0-10

## 2024-03-16 NOTE — ED PROVIDER NOTES
HPI   No chief complaint on file.      Limitations to History: None    HPI: 48-year-old female presents with concern for shortness of breath, cough.  States that 3 days ago began having pain in her back and was concerned for kidney stone.  Went to local emergency department where they found she did not have a kidney stone.  Was found to be in atrial fibrillation which she has in the past.  Patient states she has been unable to lie flat because she cannot breathe.  Has been sleeping in a recliner.  States that she is not currently on any medication for atrial fibrillation including no anticoagulation or rate control.  Denies any fever, chills, nausea, vomiting, abdominal pain, urinary symptoms.    ------------------------------------------------------------------------------------------------------------------------------------------  Physical Exam:    VS: As documented in the triage note and EMR flowsheet from this visit were reviewed.    Appearance: Alert. cooperative,  in no acute distress.   Skin: Intact,  dry skin, no lesions, rash, petechiae or purpura.   Eyes: PERRLA, EOMs intact,  Conjunctiva pink with no redness or exudates.   HENT: Normocephalic, atraumatic. Nares patent. No intraoral lesions.   Neck: Supple, without meningismus. Trachea at midline. No lymphadenopathy.  Pulmonary: Clear bilaterally with good chest wall excursion. No rales, rhonchi or wheezing. No accessory muscle use or stridor.  Cardiac: Tachycardic and irregular rhythm, no rubs, murmurs, or gallops.   Abdomen: Abdomen is soft, nontender, and nondistended.  No palpable organomegaly.  No rebound or guarding.  No CVA tenderness. Nonsurgical abdomen.  Genitourinary: Exam deferred.  Musculoskeletal: Full range of motion.  Pulses full and equal. No cyanosis, clubbing, or edema.  Neurological:  Cranial nerves are grossly intact, grossly normal sensation, no weakness, no focal findings identified.  Psychiatric: Appropriate mood and  affect.                            No data recorded                   Patient History   Past Medical History:   Diagnosis Date    Personal history of (healed) traumatic fracture     History of fracture of foot    Personal history of colonic polyps     History of colonic polyps    Personal history of other diseases of the circulatory system     History of cardiovascular disorder     Past Surgical History:   Procedure Laterality Date    CARDIAC CATHETERIZATION       SECTION, CLASSIC      CHOLECYSTECTOMY      HERNIA REPAIR      HYSTERECTOMY      LITHOTRIPSY      OTHER SURGICAL HISTORY  2020    Lipoma excision    TONSILLECTOMY       Family History   Problem Relation Name Age of Onset    No Known Problems Mother      Hypertension Father      Diabetes type II Father      Tongue cancer Father      Throat cancer Father      Kidney disease Father      Dementia Father      Anemia Father      Hydrocephalus Maternal Grandmother      Lung cancer Paternal Grandmother      COPD Paternal Grandmother      Emphysema Paternal Grandmother      Colon cancer Paternal Grandfather       Social History     Tobacco Use    Smoking status: Never     Passive exposure: Never    Smokeless tobacco: Never   Vaping Use    Vaping Use: Never used   Substance Use Topics    Alcohol use: Never    Drug use: Never       Physical Exam   ED Triage Vitals   Temp Pulse Resp BP   -- -- -- --      SpO2 Temp src Heart Rate Source Patient Position   -- -- -- --      BP Location FiO2 (%)     -- --       Physical Exam    ED Course & MDM   Diagnoses as of 24 1220   Atrial fibrillation with RVR (CMS/[facility])       Medical Decision Making  Labs Reviewed  COMPREHENSIVE METABOLIC PANEL - Abnormal     Glucose                       109 (*)                Sodium                        138                    Potassium                     3.7                    Chloride                      109 (*)                Bicarbonate                   20 (*)                  Anion Gap                     13                     Urea Nitrogen                 12                     Creatinine                    0.80                   eGFR                          90                     Calcium                       9.0                    Albumin                       4.2                    Alkaline Phosphatase          63                     Total Protein                 6.8                    AST                           23                     Bilirubin, Total              0.5                    ALT                           27                  B-TYPE NATRIURETIC PEPTIDE - Abnormal     BNP                           379 (*)                    Narrative:    <100 pg/mL - Heart failure unlikely                  100-299 pg/mL - Intermediate probability of acute heart                                  failure exacerbation. Correlate with clinical                                  context and patient history.                    >=300 pg/mL - Heart Failure likely. Correlate with clinical                                  context and patient history.                                    BNP testing is performed using different testing methodology at [facility] than at other [facility]. Direct result comparisons should only be made within the same method.                     URINALYSIS WITH REFLEX CULTURE AND MICROSCOPIC - Abnormal     Color, Urine                  Avelina (*)               Appearance, Urine             Hazy (*)               Specific Gravity, Urine       1.039 (*)               pH, Urine                     5.0                    Protein, Urine                >=500 (3+) (*)               Glucose, Urine                NEGATIVE                Blood, Urine                    (*)                  Ketones, Urine                NEGATIVE                Bilirubin, Urine              SMALL (1+) (*)               Urobilinogen, Urine           <2.0                    Nitrite, Urine                NEGATIVE                Leukocyte Esterase, Urine     SMALL (1+) (*)            MICROSCOPIC ONLY, URINE - Abnormal     WBC, Urine                    6-10 (*)               RBC, Urine                    11-20 (*)               Squamous Epithelial Cells, Urine   26-50 (1+)                Hyaline Casts, Urine          OCCASIONAL (*)            TROPONIN I, HIGH SENSITIVITY - Normal     Troponin I, High Sensitivity   5                          Narrative: Less than 99th percentile of normal range cutoff-                  Female and children under 18 years old <14 ng/L; Male <21 ng/L: Negative                  Repeat testing should be performed if clinically indicated.                                     Female and children under 18 years old 14-50 ng/L; Male 21-50 ng/L:                  Consistent with possible cardiac damage and possible increased clinical                   risk. Serial measurements may help to assess extent of myocardial damage.                                     >50 ng/L: Consistent with cardiac damage, increased clinical risk and                  myocardial infarction. Serial measurements may help assess extent of                   myocardial damage.                                      NOTE: Children less than 1 year old may have higher baseline troponin                   levels and results should be interpreted in conjunction with the overall                   clinical context.                                     NOTE: Troponin I testing is performed using a different                   testing methodology at [facility] than at other                   [facility]. Direct result comparisons should only                   be made within the same method.  SARS-COV-2 PCR - Normal     Coronavirus 2019, PCR                                    Narrative: This assay has received FDA Emergency Use Authorization (EUA) and is only authorized for the duration of  time that circumstances exist to justify the authorization of the emergency use of in vitro diagnostic tests for the detection of SARS-CoV-2 virus and/or diagnosis of COVID-19 infection under section 564(b)(1) of the Act, 21 U.S.C. 360bbb-3(b)(1). This assay is an in vitro diagnostic nucleic acid amplification test for the qualitative detection of SARS-CoV-2 from nasopharyngeal specimens and has been validated for use at [facility]. Negative results do not preclude COVID-19 infections and should not be used as the sole basis for diagnosis, treatment, or other management decisions.                    INFLUENZA A AND B PCR - Normal     Flu A Result                                         Flu B Result                                             Narrative: This assay is an in vitro diagnostic multiplex nucleic acid amplification test for the detection and discrimination of Influenza A & B from nasopharyngeal specimens, and has been validated for use at [facility]. Negative results do not preclude Influenza A/B infections, and should not be used as the sole basis for diagnosis, treatment, or other management decisions. If Influenza A/B and RSV PCR results are negative, testing for Parainfluenza virus, Adenovirus and Metapneumovirus is routinely performed for [facility] [place] in[place], and is available on other patients by placing an add-on request.  URINE CULTURE  CBC WITH AUTO DIFFERENTIAL     WBC                           8.5                    nRBC                          0.0                    RBC                           4.51                   Hemoglobin                    14.1                   Hematocrit                    42.7                   MCV                           95                     MCH                           31.3                   MCHC                          33.0                   RDW                           12.4                    Platelets                     379                    Neutrophils %                 55.4                   Immature Granulocytes %, Automated   0.5                    Lymphocytes %                 30.3                   Monocytes %                   7.4                    Eosinophils %                 5.7                    Basophils %                   0.7                    Neutrophils Absolute          4.71                   Immature Granulocytes Absolute, Au*   0.04                   Lymphocytes Absolute          2.57                   Monocytes Absolute            0.63                   Eosinophils Absolute          0.48                   Basophils Absolute            0.06                URINALYSIS WITH REFLEX CULTURE AND MICROSCOPIC         Narrative: The following orders were created for panel order Urinalysis with Reflex Culture and Microscopic.                  Procedure                               Abnormality         Status                                     ---------                               -----------         ------                                     Urinalysis with Reflex C...[[id number]]  Abnormal            Final result                               Extra Urine Gray Tube[[id number]]                            In process                                                   Please view results for these tests on the individual orders.  EXTRA URINE GRAY TUBE  CT angio chest for pulmonary embolism   Final Result    No evidence of PE.          Benign lung nodules. Slightly prominent hilar nodes of uncertain    significance.          Signed by: Willa Bermudez 3/16/2024 11:45 AM    Dictation workstation:   [id number]     Medical Decision Making:    Patient appears well nontoxic.  Atrial fibrillation with rapid ventricular rate.  No hypoxemia.  Normotensive.  Patient treated with intravenous Cardizem.  Good reduction in heart rate and given oral metoprolol.  Lab work with elevated BNP.   Troponin negative.  CTA without pulmonary embolism or volume overload.  Case discussed with cardiology and patient will be started on Eliquis as well as metoprolol.  Advised on follow-up with primary care and cardiology as an outpatient.  Asked return for worsening shortness of breath.  Stable at time of discharge.    Differential Diagnoses Considered: Atrial fibrillation with RVR, pneumonia, pneumothorax, pulmonary embolism, bronchitis, electrolyte abnormality    Independent Interpretation of Studies:  I independently interpreted: CTA shows no central pulmonary embolism.    Escalation of Care:  Appropriate for discharge and follow-up with primary care and cardiology.    Prescription Drug Consideration: Oral Eliquis and metoprolol.    Discussion of Management with Other Providers:   I discussed the patient/results with: Cardiology on-call Dr. Sanchez.          Procedure  ECG 12 lead    Performed by: Agustin Lozano DO  Authorized by: Agustin Lozano DO    ECG interpreted by ED Physician in the absence of a cardiologist: yes    Comments:      EKG interpreted by Dr. Agustin Lozano: Atrial fibrillation with rapid ventricular response at 137 bpm.  QTc of 477 ms.  Nonspecific ST changes.  Critical Care    Performed by: Agustin Lozano DO  Authorized by: Augstin Lozano DO    Critical care provider statement:     Critical care time (minutes):  35    Critical care time was exclusive of:  Separately billable procedures and treating other patients    Critical care was necessary to treat or prevent imminent or life-threatening deterioration of the following conditions: atrial fibrillation with RVR requiring IV rate control medication.    Critical care was time spent personally by me on the following activities:  Development of treatment plan with patient or surrogate, obtaining history from patient or surrogate, ordering and performing treatments and interventions, ordering and review of laboratory  studies, ordering and review of radiographic studies, re-evaluation of patient's condition and discussions with consultants       Agustin Godfrey,   03/16/24 1221       Agustin Godfrey,   03/26/24 [id number]

## 2024-03-17 LAB — BACTERIA UR CULT: NORMAL

## 2024-03-19 LAB
Q ONSET: 223 MS
QRS COUNT: 22 BEATS
QRS DURATION: 84 MS
QT INTERVAL: 316 MS
QTC CALCULATION(BAZETT): 477 MS
QTC FREDERICIA: 416 MS
R AXIS: 64 DEGREES
T AXIS: 49 DEGREES
T OFFSET: 381 MS
VENTRICULAR RATE: 137 BPM

## 2024-04-13 ENCOUNTER — HOSPITAL ENCOUNTER (EMERGENCY)
Facility: HOSPITAL | Age: 49
Discharge: HOME | End: 2024-04-13
Payer: MEDICAID

## 2024-04-13 ENCOUNTER — APPOINTMENT (OUTPATIENT)
Dept: RADIOLOGY | Facility: HOSPITAL | Age: 49
End: 2024-04-13
Payer: MEDICAID

## 2024-04-13 VITALS
BODY MASS INDEX: 43.32 KG/M2 | TEMPERATURE: 98 F | OXYGEN SATURATION: 97 % | HEART RATE: 82 BPM | HEIGHT: 65 IN | RESPIRATION RATE: 18 BRPM | DIASTOLIC BLOOD PRESSURE: 82 MMHG | WEIGHT: 260 LBS | SYSTOLIC BLOOD PRESSURE: 148 MMHG

## 2024-04-13 DIAGNOSIS — S82.891A CLOSED FRACTURE OF RIGHT ANKLE, INITIAL ENCOUNTER: Primary | ICD-10-CM

## 2024-04-13 PROCEDURE — 2500000001 HC RX 250 WO HCPCS SELF ADMINISTERED DRUGS (ALT 637 FOR MEDICARE OP): Performed by: NURSE PRACTITIONER

## 2024-04-13 PROCEDURE — 73610 X-RAY EXAM OF ANKLE: CPT | Mod: RIGHT SIDE | Performed by: STUDENT IN AN ORGANIZED HEALTH CARE EDUCATION/TRAINING PROGRAM

## 2024-04-13 PROCEDURE — 99283 EMERGENCY DEPT VISIT LOW MDM: CPT

## 2024-04-13 PROCEDURE — 73610 X-RAY EXAM OF ANKLE: CPT | Mod: RT

## 2024-04-13 RX ORDER — TRAMADOL HYDROCHLORIDE 50 MG/1
50 TABLET ORAL EVERY 6 HOURS PRN
Qty: 10 TABLET | Refills: 0 | Status: SHIPPED | OUTPATIENT
Start: 2024-04-13 | End: 2024-04-16

## 2024-04-13 RX ORDER — IBUPROFEN 800 MG/1
800 TABLET ORAL ONCE
Status: COMPLETED | OUTPATIENT
Start: 2024-04-13 | End: 2024-04-13

## 2024-04-13 RX ADMIN — IBUPROFEN 800 MG: 800 TABLET, FILM COATED ORAL at 16:35

## 2024-04-13 ASSESSMENT — PAIN SCALES - GENERAL
PAINLEVEL_OUTOF10: 9
PAINLEVEL_OUTOF10: 10 - WORST POSSIBLE PAIN

## 2024-04-13 ASSESSMENT — PAIN - FUNCTIONAL ASSESSMENT: PAIN_FUNCTIONAL_ASSESSMENT: 0-10

## 2024-04-13 ASSESSMENT — PAIN DESCRIPTION - ORIENTATION: ORIENTATION: RIGHT

## 2024-04-13 ASSESSMENT — PAIN DESCRIPTION - LOCATION: LOCATION: ANKLE

## 2024-04-13 ASSESSMENT — COLUMBIA-SUICIDE SEVERITY RATING SCALE - C-SSRS
6. HAVE YOU EVER DONE ANYTHING, STARTED TO DO ANYTHING, OR PREPARED TO DO ANYTHING TO END YOUR LIFE?: NO
1. IN THE PAST MONTH, HAVE YOU WISHED YOU WERE DEAD OR WISHED YOU COULD GO TO SLEEP AND NOT WAKE UP?: NO
2. HAVE YOU ACTUALLY HAD ANY THOUGHTS OF KILLING YOURSELF?: NO

## 2024-04-17 ENCOUNTER — OFFICE VISIT (OUTPATIENT)
Dept: ORTHOPEDIC SURGERY | Facility: CLINIC | Age: 49
End: 2024-04-17
Payer: MEDICAID

## 2024-04-17 DIAGNOSIS — S82.401A TIBIA/FIBULA FRACTURE, RIGHT, CLOSED, INITIAL ENCOUNTER: Primary | ICD-10-CM

## 2024-04-17 DIAGNOSIS — S82.201A TIBIA/FIBULA FRACTURE, RIGHT, CLOSED, INITIAL ENCOUNTER: Primary | ICD-10-CM

## 2024-04-17 PROCEDURE — 3008F BODY MASS INDEX DOCD: CPT | Performed by: NURSE PRACTITIONER

## 2024-04-17 PROCEDURE — 99204 OFFICE O/P NEW MOD 45 MIN: CPT | Performed by: NURSE PRACTITIONER

## 2024-04-17 PROCEDURE — 1036F TOBACCO NON-USER: CPT | Performed by: NURSE PRACTITIONER

## 2024-04-17 ASSESSMENT — ENCOUNTER SYMPTOMS
CARDIOVASCULAR NEGATIVE: 1
PSYCHIATRIC NEGATIVE: 1
ARTHRALGIAS: 1
RESPIRATORY NEGATIVE: 1
HEMATOLOGIC/LYMPHATIC NEGATIVE: 1
ENDOCRINE NEGATIVE: 1
CONSTITUTIONAL NEGATIVE: 1

## 2024-04-17 ASSESSMENT — PAIN - FUNCTIONAL ASSESSMENT: PAIN_FUNCTIONAL_ASSESSMENT: 0-10

## 2024-04-17 NOTE — PROGRESS NOTES
Subjective    Patient ID: Inge Vance is a 48 y.o. female.    Chief Complaint: Pain of the Right Ankle       HPI:    Inge is a pleasant 48-year-old female presenting today for new patient evaluation of a right ankle injury with fracture distal fib and questionable distal tib, possible syndesmosis injury.  Patient tripped over a cement curb at a gas station, this occurred on 4/13/2024.  Patient was referred for follow-up care and was placed in a tall walking boot and crutches.   Positive history of bilateral ankle fractures approximately 20 years ago, no surgery required        Review of Systems   Constitutional: Negative.    HENT: Negative.     Respiratory: Negative.     Cardiovascular: Negative.    Endocrine: Negative.    Musculoskeletal:  Positive for arthralgias and gait problem.   Skin: Negative.    Hematological: Negative.    Psychiatric/Behavioral: Negative.         Objective   Right Ankle Exam     Tenderness   The patient is experiencing tenderness in the ATF, lateral malleolus and proximal fibula.  Swelling: moderate    Range of Motion   Dorsiflexion:  0   Plantar flexion:  30   Eversion:  10   Inversion:  20     Tests   Anterior drawer: negative  Varus tilt: negative    Other   Erythema: absent  Sensation: normal  Pulse: present     Comments:  Patient denies navicular pain, Achilles soft nontender, negative for Gaona's test.  Negative squeeze test of mid to distal lower extremity    Full ROM of distal joints with no sx aggravation, distal motor and sensory intact, cap refill at 2 seconds.            Image Results:  XR ankle right 3+ views  Narrative: Interpreted By:  Karlene Davenport,   STUDY:  XR ANKLE RIGHT 3+ VIEWS; ;  4/13/2024 4:37 pm      INDICATION:  Signs/Symptoms:fall, twisted, pain.      COMPARISON:  None.      ACCESSION NUMBER(S):  [id number]      ORDERING CLINICIAN:  CRISELDA HERNÁNDEZ      FINDINGS:  Three views of the right ankle are provided for interpretation.      There is  soft tissue swelling overlying the lateral malleolus, with  minimally displaced fracture present along the distal aspect of the  right fibula.      Ankle mortise is intact, although there is some widening of the  distal tibiofibula syndesmosis with irregular ossific fragment  present along the lateral margin of the distal tibia, possibly  representing an underlying ligamentous injury with a small avulsion  fracture not excluded.      Degenerative changes are present in the hindfoot. Plantar  enthesophyte is present. Mild soft tissue swelling is also present  overlying the medial malleolus.      Impression: 1.  Soft tissue swelling overlies the lateral malleolus, with  minimally displaced fracture present along the distal aspect of the  right fibula.  2. Although the ankle mortise is intact, there is some questionable  widening of the tibiofibular syndesmosis with irregular shaped bony  fragment present along the lateral margin of the tibia, possibly  representing ligamentous injury with some associated avulsion  fracture.              MACRO:  None      Signed by: Karlene Davenport 4/13/2024 5:01 PM  Dictation workstation:   [id number]      Assessment/Plan   Encounter Diagnoses:  Problem List Items Addressed This Visit             ICD-10-CM    Tibia/fibula fracture, right, closed, initial encounter - Primary S82.201A, S82.401A     Sx control with OTC NSAIDs per package directions, take with food, Tylenol prn.  We discussed use of remaining tramadol 1/2 to 1 tablet as needed for severe pain.  Patient to wear the walking boot full-time, may remove with rest and elevation and as needed at night.  Patient advised to not bear any weight until follow-up visit.  Patient has crutches for ambulation.  I did offer a wheelchair or knee walker, patient declines at this time.  Activity restriction recommended: Nonweightbearing of the right leg   Follow up here 1 weeks with repeat ankle x-rays with stress view, sooner for  changes or concerns.    Collaborated with Dr. Chacon  This note was generated using Dragon software.  It may contain errors in wording, punctuation or spelling.         Relevant Orders    Follow Up In Orthopaedic Surgery    XR ankle right 3+ views

## 2024-04-18 NOTE — ASSESSMENT & PLAN NOTE
Sx control with OTC NSAIDs per package directions, take with food, Tylenol prn.  We discussed use of remaining tramadol 1/2 to 1 tablet as needed for severe pain.  Patient to wear the walking boot full-time, may remove with rest and elevation and as needed at night.  Patient advised to not bear any weight until follow-up visit.  Patient has crutches for ambulation.  I did offer a wheelchair or knee walker, patient declines at this time.  Activity restriction recommended: Nonweightbearing of the right leg   Follow up here 1 weeks with repeat ankle x-rays with stress view, sooner for changes or concerns.    Collaborated with Dr. Chacon  This note was generated using Dragon software.  It may contain errors in wording, punctuation or spelling.

## 2024-04-25 ENCOUNTER — HOSPITAL ENCOUNTER (OUTPATIENT)
Dept: RADIOLOGY | Facility: CLINIC | Age: 49
Discharge: HOME | End: 2024-04-25
Payer: MEDICAID

## 2024-04-25 ENCOUNTER — OFFICE VISIT (OUTPATIENT)
Dept: ORTHOPEDIC SURGERY | Facility: CLINIC | Age: 49
End: 2024-04-25
Payer: MEDICAID

## 2024-04-25 DIAGNOSIS — S82.201A TIBIA/FIBULA FRACTURE, RIGHT, CLOSED, INITIAL ENCOUNTER: Primary | ICD-10-CM

## 2024-04-25 DIAGNOSIS — S82.401A TIBIA/FIBULA FRACTURE, RIGHT, CLOSED, INITIAL ENCOUNTER: Primary | ICD-10-CM

## 2024-04-25 DIAGNOSIS — S82.401A TIBIA/FIBULA FRACTURE, RIGHT, CLOSED, INITIAL ENCOUNTER: ICD-10-CM

## 2024-04-25 DIAGNOSIS — S82.201A TIBIA/FIBULA FRACTURE, RIGHT, CLOSED, INITIAL ENCOUNTER: ICD-10-CM

## 2024-04-25 PROCEDURE — 73610 X-RAY EXAM OF ANKLE: CPT | Mod: RIGHT SIDE | Performed by: RADIOLOGY

## 2024-04-25 PROCEDURE — 99213 OFFICE O/P EST LOW 20 MIN: CPT | Performed by: NURSE PRACTITIONER

## 2024-04-25 PROCEDURE — 3008F BODY MASS INDEX DOCD: CPT | Performed by: NURSE PRACTITIONER

## 2024-04-25 PROCEDURE — 1036F TOBACCO NON-USER: CPT | Performed by: NURSE PRACTITIONER

## 2024-04-25 PROCEDURE — 73610 X-RAY EXAM OF ANKLE: CPT | Mod: RT

## 2024-04-25 ASSESSMENT — ENCOUNTER SYMPTOMS
ARTHRALGIAS: 1
CARDIOVASCULAR NEGATIVE: 1
PSYCHIATRIC NEGATIVE: 1
ENDOCRINE NEGATIVE: 1
HEMATOLOGIC/LYMPHATIC NEGATIVE: 1
RESPIRATORY NEGATIVE: 1
CONSTITUTIONAL NEGATIVE: 1

## 2024-04-25 ASSESSMENT — PAIN SCALES - GENERAL: PAINLEVEL_OUTOF10: 4

## 2024-04-25 ASSESSMENT — PAIN DESCRIPTION - DESCRIPTORS: DESCRIPTORS: SHARP

## 2024-04-25 ASSESSMENT — PAIN - FUNCTIONAL ASSESSMENT: PAIN_FUNCTIONAL_ASSESSMENT: 0-10

## 2024-04-25 NOTE — ASSESSMENT & PLAN NOTE
Sx control with OTC NSAIDs per package directions, take with food, Tylenol prn.  Elevation and ice  Patient to wear the walking boot full-time, may remove with rest and elevation and as needed at night.  Patient may gradually transition to weightbearing with crutches, recommended over the next few days and weightbearing as tolerated in the boot.  Activity restriction recommended: May transition to weightbearing as tolerated in boot of right leg  Follow up here 2-3 weeks with repeat ankle x-rays, sooner for changes or concerns.    Collaborated with Dr. Chacon  This note was generated using Dragon software.  It may contain errors in wording, punctuation or spelling.

## 2024-04-25 NOTE — PROGRESS NOTES
Subjective    Patient ID: Inge Vance is a 48 y.o. female.    Chief Complaint:   Chief Complaint   Patient presents with    Right Ankle - New Patient Visit, Fracture     Patient had a fall on 04/13/2024. She went to [facility] ER at that time, they did X-rays.  She was fitted with a walking boot and crutches.        HPI:    Inge is a pleasant 48-year-old female presenting today for FUV of a right ankle injury with fracture distal fib and questionable distal tib, possible syndesmosis injury.  Patient tripped over a cement curb at a gas station on 4/13/2024.  Patient was placed in a tall walking boot and crutches, obtained a knee scooter and has been nonweightbearing since last week's visit.  Positive history of bilateral ankle fractures approximately 20 years ago, no surgery required  Using ibuprofen and Tylenol with good symptom control.  Elevation and ice help      Review of Systems   Constitutional: Negative.    HENT: Negative.     Respiratory: Negative.     Cardiovascular: Negative.    Endocrine: Negative.    Musculoskeletal:  Positive for arthralgias and gait problem.   Skin: Negative.    Hematological: Negative.    Psychiatric/Behavioral: Negative.         Objective   Right Ankle Exam     Tenderness   The patient is experiencing tenderness in the ATF, proximal fibula and lateral malleolus.  Swelling: moderate    Range of Motion   Dorsiflexion:  10   Plantar flexion:  30   Eversion:  10   Inversion:  20     Tests   Anterior drawer: negative  Varus tilt: negative    Other   Erythema: absent  Sensation: normal  Pulse: present     Comments:  Patient denies navicular pain, Achilles soft nontender, negative for Gaona's test.  Negative squeeze test of mid to distal lower extremity    Full ROM of distal joints with no sx aggravation, distal motor and sensory intact, cap refill at 2 seconds.            Image Results:  XR ankle right 3+ views  Narrative: Interpreted By:  Karlene Davenport,   STUDY:  XR ANKLE RIGHT  3+ VIEWS; ;  4/13/2024 4:37 pm      INDICATION:  Signs/Symptoms:fall, twisted, pain.      COMPARISON:  None.      ACCESSION NUMBER(S):  [id number]      ORDERING CLINICIAN:  CRISELDA HERNÁNDEZ      FINDINGS:  Three views of the right ankle are provided for interpretation.      There is soft tissue swelling overlying the lateral malleolus, with  minimally displaced fracture present along the distal aspect of the  right fibula.      Ankle mortise is intact, although there is some widening of the  distal tibiofibula syndesmosis with irregular ossific fragment  present along the lateral margin of the distal tibia, possibly  representing an underlying ligamentous injury with a small avulsion  fracture not excluded.      Degenerative changes are present in the hindfoot. Plantar  enthesophyte is present. Mild soft tissue swelling is also present  overlying the medial malleolus.      Impression: 1.  Soft tissue swelling overlies the lateral malleolus, with  minimally displaced fracture present along the distal aspect of the  right fibula.  2. Although the ankle mortise is intact, there is some questionable  widening of the tibiofibular syndesmosis with irregular shaped bony  fragment present along the lateral margin of the tibia, possibly  representing ligamentous injury with some associated avulsion  fracture.              MACRO:  None      Signed by: Karlene Davenport 4/13/2024 5:01 PM  Dictation workstation:   [id number]    Additional ankle x-rays obtained today with AP stress view.  These were collaborated with Dr. Snell on date of visit.  Distal fibula fracture with alignment maintained.  There is no clear evidence of a distal tibia fracture.  Ankle mortise is intact.  Await radiology report.    Assessment/Plan   Encounter Diagnoses:    Problem List Items Addressed This Visit             ICD-10-CM    Tibia/fibula fracture, right, closed, initial encounter - Primary S82.201A, S82.401A     Sx control with OTC  NSAIDs per package directions, take with food, Tylenol prn.  Elevation and ice  Patient to wear the walking boot full-time, may remove with rest and elevation and as needed at night.  Patient may gradually transition to weightbearing with crutches, recommended over the next few days and weightbearing as tolerated in the boot.  Activity restriction recommended: May transition to weightbearing as tolerated in boot of right leg  Follow up here 2-3 weeks with repeat ankle x-rays, sooner for changes or concerns.    Collaborated with Dr. Chacon  This note was generated using Dragon software.  It may contain errors in wording, punctuation or spelling.         Relevant Orders    XR ankle left 3+ views

## 2024-05-10 ENCOUNTER — HOSPITAL ENCOUNTER (OUTPATIENT)
Dept: RADIOLOGY | Facility: CLINIC | Age: 49
Discharge: HOME | End: 2024-05-10
Payer: MEDICAID

## 2024-05-10 ENCOUNTER — OFFICE VISIT (OUTPATIENT)
Dept: ORTHOPEDIC SURGERY | Facility: CLINIC | Age: 49
End: 2024-05-10
Payer: MEDICAID

## 2024-05-10 DIAGNOSIS — S82.201A TIBIA/FIBULA FRACTURE, RIGHT, CLOSED, INITIAL ENCOUNTER: ICD-10-CM

## 2024-05-10 DIAGNOSIS — S82.401A TIBIA/FIBULA FRACTURE, RIGHT, CLOSED, INITIAL ENCOUNTER: Primary | ICD-10-CM

## 2024-05-10 DIAGNOSIS — S82.401A TIBIA/FIBULA FRACTURE, RIGHT, CLOSED, INITIAL ENCOUNTER: ICD-10-CM

## 2024-05-10 DIAGNOSIS — S82.201A TIBIA/FIBULA FRACTURE, RIGHT, CLOSED, INITIAL ENCOUNTER: Primary | ICD-10-CM

## 2024-05-10 PROCEDURE — 73610 X-RAY EXAM OF ANKLE: CPT | Mod: RT

## 2024-05-10 PROCEDURE — 1036F TOBACCO NON-USER: CPT | Performed by: NURSE PRACTITIONER

## 2024-05-10 PROCEDURE — 73610 X-RAY EXAM OF ANKLE: CPT | Mod: RIGHT SIDE | Performed by: RADIOLOGY

## 2024-05-10 PROCEDURE — 99213 OFFICE O/P EST LOW 20 MIN: CPT | Performed by: NURSE PRACTITIONER

## 2024-05-10 PROCEDURE — 3008F BODY MASS INDEX DOCD: CPT | Performed by: NURSE PRACTITIONER

## 2024-05-10 ASSESSMENT — ENCOUNTER SYMPTOMS
RESPIRATORY NEGATIVE: 1
PSYCHIATRIC NEGATIVE: 1
CARDIOVASCULAR NEGATIVE: 1
CONSTITUTIONAL NEGATIVE: 1
HEMATOLOGIC/LYMPHATIC NEGATIVE: 1
ARTHRALGIAS: 1
ENDOCRINE NEGATIVE: 1

## 2024-05-10 ASSESSMENT — PAIN SCALES - GENERAL: PAINLEVEL_OUTOF10: 8

## 2024-05-10 ASSESSMENT — PAIN DESCRIPTION - DESCRIPTORS: DESCRIPTORS: SHARP;SHOOTING

## 2024-05-10 ASSESSMENT — PAIN - FUNCTIONAL ASSESSMENT: PAIN_FUNCTIONAL_ASSESSMENT: 0-10

## 2024-05-10 NOTE — PROGRESS NOTES
Subjective    Patient ID: Inge Vance is a 48 y.o. female.    Chief Complaint:   Chief Complaint   Patient presents with    Right Ankle - Follow-up, Fracture     Patient is here for follow up of TIB/FIB FX that occurred on 04/13/2024. Patient states she feels she may have been doing too much weight bearing on it.        HPI:    Inge is a pleasant 48-year-old female presenting today for FUV of a right ankle injury with fracture distal fib and questionable distal tib, possible syndesmosis injury.  Patient tripped over a cement curb at a gas station on 4/13/2024.  Patient was placed in a tall walking boot and crutches, obtained a knee scooter and has been nonweightbearing since last week's visit.  Positive history of bilateral ankle fractures approximately 20 years ago, no surgery required  Using ibuprofen and Tylenol with good symptom control.  Elevation and ice help    + wt bearing, increased pain  Knee scooter on cruise     Review of Systems   Constitutional: Negative.    HENT: Negative.     Respiratory: Negative.     Cardiovascular: Negative.    Endocrine: Negative.    Musculoskeletal:  Positive for arthralgias and gait problem.   Skin: Negative.    Hematological: Negative.    Psychiatric/Behavioral: Negative.         Objective   Right Ankle Exam     Tenderness   The patient is experiencing tenderness in the ATF, proximal fibula and lateral malleolus.  Swelling: moderate    Range of Motion   Dorsiflexion:  10   Plantar flexion:  30   Eversion:  10   Inversion:  20     Tests   Anterior drawer: negative  Varus tilt: negative    Other   Erythema: absent  Sensation: normal  Pulse: present     Comments:  Patient denies navicular pain, Achilles soft nontender, negative for Gaona's test.  Negative squeeze test of mid to distal lower extremity    Full ROM of distal joints with no sx aggravation, distal motor and sensory intact, cap refill at 2 seconds.          Image Results:  XR ankle right 3+ views  Narrative:  Interpreted By:  Alessio Bermudez,   STUDY:  XR ANKLE RIGHT 3+ VIEWS      INDICATION:  Signs/Symptoms:right ankle fracture.      COMPARISON:  April 13, 2014      ACCESSION NUMBER(S):  [id number]      ORDERING CLINICIAN:  REID CHAVEZ      FINDINGS:  Nondisplaced lateral malleolar fracture unchanged in alignment. No  new findings. Soft tissue swelling unchanged.      Impression: Nondisplaced fracture lateral malleolus right distal fibula.      Signed by: Alessio Bermudez 4/26/2024 6:31 PM  Dictation workstation:   [id number]    Additional ankle x-rays obtained today with AP stress view.  These were collaborated with Dr. Snell on date of visit.  Distal fibula fracture with alignment maintained.  There is no clear evidence of a distal tibia fracture.  Ankle mortise is intact.  Await radiology report.    Assessment/Plan   Encounter Diagnoses:      Problem List Items Addressed This Visit             ICD-10-CM    Tibia/fibula fracture, right, closed, initial encounter S82.201A, S82.401A     Sx control with OTC NSAIDs per package directions, take with food, Tylenol prn.  Elevation and ice  Patient to wear the walking boot full-time, may remove with rest and elevation and as needed at night.  Patient may gradually transition to weightbearing with crutches, recommended over the next few days and weightbearing as tolerated in the boot.  Activity restriction recommended: May transition to weightbearing as tolerated in boot of right leg  Follow up here 2-3 weeks with repeat ankle x-rays, sooner for changes or concerns.    Collaborated with Dr. Chacon  This note was generated using Dragon software.  It may contain errors in wording, punctuation or spelling.         Relevant Orders    XR ankle right 3+ views

## 2024-05-12 DIAGNOSIS — E55.9 VITAMIN D DEFICIENCY: Primary | ICD-10-CM

## 2024-05-14 RX ORDER — ERGOCALCIFEROL 1.25 MG/1
1 CAPSULE ORAL
Qty: 13 CAPSULE | Refills: 3 | Status: SHIPPED | OUTPATIENT
Start: 2024-05-19

## 2024-05-28 ENCOUNTER — OFFICE VISIT (OUTPATIENT)
Dept: ORTHOPEDIC SURGERY | Facility: CLINIC | Age: 49
End: 2024-05-28
Payer: MEDICAID

## 2024-05-28 ENCOUNTER — HOSPITAL ENCOUNTER (OUTPATIENT)
Dept: RADIOLOGY | Facility: CLINIC | Age: 49
Discharge: HOME | End: 2024-05-28
Payer: MEDICAID

## 2024-05-28 DIAGNOSIS — S82.201A TIBIA/FIBULA FRACTURE, RIGHT, CLOSED, INITIAL ENCOUNTER: Primary | ICD-10-CM

## 2024-05-28 DIAGNOSIS — S82.401A TIBIA/FIBULA FRACTURE, RIGHT, CLOSED, INITIAL ENCOUNTER: Primary | ICD-10-CM

## 2024-05-28 DIAGNOSIS — S82.201A TIBIA/FIBULA FRACTURE, RIGHT, CLOSED, INITIAL ENCOUNTER: ICD-10-CM

## 2024-05-28 DIAGNOSIS — S82.401A TIBIA/FIBULA FRACTURE, RIGHT, CLOSED, INITIAL ENCOUNTER: ICD-10-CM

## 2024-05-28 PROCEDURE — 1036F TOBACCO NON-USER: CPT | Performed by: NURSE PRACTITIONER

## 2024-05-28 PROCEDURE — 99213 OFFICE O/P EST LOW 20 MIN: CPT | Performed by: NURSE PRACTITIONER

## 2024-05-28 PROCEDURE — 73610 X-RAY EXAM OF ANKLE: CPT | Mod: RT

## 2024-05-28 PROCEDURE — 3008F BODY MASS INDEX DOCD: CPT | Performed by: NURSE PRACTITIONER

## 2024-05-28 PROCEDURE — 73610 X-RAY EXAM OF ANKLE: CPT | Mod: RIGHT SIDE | Performed by: RADIOLOGY

## 2024-05-28 PROCEDURE — L1902 AFO ANKLE GAUNTLET PRE OTS: HCPCS | Performed by: NURSE PRACTITIONER

## 2024-05-28 ASSESSMENT — PAIN - FUNCTIONAL ASSESSMENT: PAIN_FUNCTIONAL_ASSESSMENT: 0-10

## 2024-05-28 ASSESSMENT — ENCOUNTER SYMPTOMS
ARTHRALGIAS: 1
HEMATOLOGIC/LYMPHATIC NEGATIVE: 1
RESPIRATORY NEGATIVE: 1
PSYCHIATRIC NEGATIVE: 1
ENDOCRINE NEGATIVE: 1
CARDIOVASCULAR NEGATIVE: 1
CONSTITUTIONAL NEGATIVE: 1

## 2024-05-28 ASSESSMENT — PAIN SCALES - GENERAL: PAINLEVEL_OUTOF10: 0 - NO PAIN

## 2024-05-28 NOTE — PROGRESS NOTES
Subjective    Patient ID: Inge Vance is a 48 y.o. female.    Chief Complaint:   Chief Complaint   Patient presents with    Right Ankle - Follow-up, Fracture     Patient is here for follow up of TIB/FIB FX that occurred on 04/13/2024. Patient has been non-weight bearing since last visit.        HPI:    Inge is a pleasant 48-year-old female presenting today for FUV of a right ankle injury with fracture distal fib and questionable distal tib, possible syndesmosis injury.  Patient tripped over a cement curb at a gas station on 4/13/2024.  Patient was placed in a tall walking boot and using a knee scooter and has been nonweightbearing since last visit.  Positive history of bilateral ankle fractures approximately 20 years ago, no surgery required  Patient describes sometimes getting an achy type of pain, unable to identify any aggravating factors.  Symptoms improved with position change, as needed Tylenol sometimes helps with symptom aggravation.  No new injuries      Review of Systems   Constitutional: Negative.    HENT: Negative.     Respiratory: Negative.     Cardiovascular: Negative.    Endocrine: Negative.    Musculoskeletal:  Positive for arthralgias and gait problem.   Skin: Negative.    Hematological: Negative.    Psychiatric/Behavioral: Negative.         Objective   Right Ankle Exam     Tenderness   The patient is experiencing tenderness in the ATF.  Swelling: moderate (Mild to moderate lateral swelling, improved since last visit)    Range of Motion   Dorsiflexion:  0   Plantar flexion:  40   Eversion:  20   Inversion:  20     Tests   Anterior drawer: negative  Varus tilt: negative    Other   Erythema: absent  Sensation: normal  Pulse: present     Comments:  Patient denies navicular pain, Achilles soft nontender, negative for Gaona's test.  Negative squeeze test of mid to distal lower extremity    Full ROM of distal joints with no sx aggravation, distal motor and sensory intact, cap refill at 2  seconds.            Image Results:  XR ankle right 3+ views  Interpreted By:  Rodney Toribio,   STUDY:  XR ANKLE RIGHT 3+ VIEWS;  5/10/2024 9:18 am      INDICATION:  Signs/Symptoms:S/P FRACTURE.      COMPARISON:  04/25/2024      ACCESSION NUMBER(S):  [id number]      ORDERING CLINICIAN:  REID CHAVEZ      FINDINGS:  Subacute redemonstration of minimally displaced fracture tip of the  lateral malleolus. Ankle mortise maintained. Mild tibiotalar  osteoarthrosis. Small plantar and posterior calcaneal spur. Minimal  bimalleolar soft tissue edema, improved since prior examination.      IMPRESSION  Unchanged redemonstration of minimally displaced avulsion fracture  tip of the lateral malleolus. No significant interval callus  formation change in alignment.          MACRO  none      Signed by: Rodney Toribio 5/12/2024 11:50 AM  Dictation workstation:   [id number]    Independent review of ankle imaging completed during today's visit, distal fibula fracture with alignment maintained and bridging callus formation.  There is no evidence of a distal tibia fracture.  Ankle mortise is intact.  Await radiology report.    Assessment/Plan   Encounter Diagnoses:    Problem List Items Addressed This Visit             ICD-10-CM    Tibia/fibula fracture, right, closed, initial encounter S82.201A, S82.401A     Sx control with OTC NSAIDs per package directions, take with food, Tylenol prn.  Elevation and ice  Patient may transition to weightbearing in boot over the next week.  As long as weightbearing with no pain, may transition into ankle speed brace which is to be fitted today.  Activity restriction recommended: May transition to full weightbearing over the next week, following week may transition into ankle speed brace  PT referral entered to guide range of motion and strengthening program  Follow-up here in approximately 4 weeks, sooner for changes or concerns.  Restrictions updated  This note was generated using Dragon software.   It may contain errors in wording, punctuation or spelling.         Relevant Orders    XR ankle right 3+ views    XR ankle right 3+ views    Referral to Physical Therapy

## 2024-05-29 NOTE — ASSESSMENT & PLAN NOTE
Sx control with OTC NSAIDs per package directions, take with food, Tylenol prn.  Elevation and ice  Patient may transition to weightbearing in boot over the next week.  As long as weightbearing with no pain, may transition into ankle speed brace which is to be fitted today.  Activity restriction recommended: May transition to full weightbearing over the next week, following week may transition into ankle speed brace  PT referral entered to guide range of motion and strengthening program  Follow-up here in approximately 4 weeks, sooner for changes or concerns.  Restrictions updated  This note was generated using Dragon software.  It may contain errors in wording, punctuation or spelling.

## 2024-05-31 DIAGNOSIS — J45.30 MILD PERSISTENT ASTHMA, UNSPECIFIED WHETHER COMPLICATED (HHS-HCC): ICD-10-CM

## 2024-05-31 RX ORDER — MONTELUKAST SODIUM 10 MG/1
10 TABLET ORAL NIGHTLY
Qty: 90 TABLET | Refills: 3 | Status: SHIPPED | OUTPATIENT
Start: 2024-05-31

## 2024-06-07 DIAGNOSIS — G89.29 CHRONIC NONINTRACTABLE HEADACHE, UNSPECIFIED HEADACHE TYPE: ICD-10-CM

## 2024-06-07 DIAGNOSIS — R51.9 CHRONIC NONINTRACTABLE HEADACHE, UNSPECIFIED HEADACHE TYPE: ICD-10-CM

## 2024-06-07 RX ORDER — TOPIRAMATE 100 MG/1
100 TABLET, FILM COATED ORAL 2 TIMES DAILY
Qty: 180 TABLET | Refills: 1 | Status: SHIPPED | OUTPATIENT
Start: 2024-06-07

## 2024-06-14 ENCOUNTER — EVALUATION (OUTPATIENT)
Dept: PHYSICAL THERAPY | Facility: CLINIC | Age: 49
End: 2024-06-14
Payer: MEDICAID

## 2024-06-14 DIAGNOSIS — S82.401A TIBIA/FIBULA FRACTURE, RIGHT, CLOSED, INITIAL ENCOUNTER: ICD-10-CM

## 2024-06-14 DIAGNOSIS — S82.401D CLOSED FRACTURE OF RIGHT TIBIA AND FIBULA, WITH ROUTINE HEALING, SUBSEQUENT ENCOUNTER: Primary | ICD-10-CM

## 2024-06-14 DIAGNOSIS — S82.201D CLOSED FRACTURE OF RIGHT TIBIA AND FIBULA, WITH ROUTINE HEALING, SUBSEQUENT ENCOUNTER: Primary | ICD-10-CM

## 2024-06-14 DIAGNOSIS — S82.201A TIBIA/FIBULA FRACTURE, RIGHT, CLOSED, INITIAL ENCOUNTER: ICD-10-CM

## 2024-06-14 PROCEDURE — 97110 THERAPEUTIC EXERCISES: CPT | Mod: GP

## 2024-06-14 PROCEDURE — 97161 PT EVAL LOW COMPLEX 20 MIN: CPT | Mod: GP

## 2024-06-14 ASSESSMENT — PAIN - FUNCTIONAL ASSESSMENT: PAIN_FUNCTIONAL_ASSESSMENT: 0-10

## 2024-06-14 ASSESSMENT — ENCOUNTER SYMPTOMS
OCCASIONAL FEELINGS OF UNSTEADINESS: 0
DEPRESSION: 0
LOSS OF SENSATION IN FEET: 0

## 2024-06-14 ASSESSMENT — PATIENT HEALTH QUESTIONNAIRE - PHQ9
2. FEELING DOWN, DEPRESSED OR HOPELESS: NOT AT ALL
1. LITTLE INTEREST OR PLEASURE IN DOING THINGS: NOT AT ALL
SUM OF ALL RESPONSES TO PHQ9 QUESTIONS 1 AND 2: 0

## 2024-06-14 ASSESSMENT — PAIN SCALES - GENERAL: PAINLEVEL_OUTOF10: 5 - MODERATE PAIN

## 2024-06-14 NOTE — PROGRESS NOTES
Physical Therapy Evaluation and Treatment      Patient Name: Inge Vance  MRN: [id number]  Today's Date: 6/14/2024  Time Calculation  Start Time: 0737  Stop Time: 0815  Time Calculation (min): 38 min    PT Evaluation Time Entry  PT Evaluation (Low) Time Entry: 30   PT Therapeutic Procedures Time Entry  Therapeutic Exercise Time Entry: 8                         Assessment:  Rehab Prognosis: Good  Barriers to Participation:  (none)  C/C sx: see pain section  ANNA:    see pain section  ADL makes worse?:WBing  ADL makes better?:----  Testing:----    Physical Findings: Limited: AROM ( R ankle )                                                Strength ( R ankle )                                                 POC:  Ongoing clinic PT to include:continue with open to closed chain ROM/PRE as reema;  Wbing with brace only    Other: (copay?- no ) (fall risk?- low  ) (ins visit limit?-  TBD )    Plan:  Treatment/Interventions: Aquatic therapy, Cryotherapy, Education/ Instruction, Gait training, Manual therapy, Neuromuscular re-education, Self care/ home management, Therapeutic exercises, Other (comment) (IASTM/cupping)  PT Plan: Skilled PT  PT Frequency: 2 times per week  Duration: 4wks  Onset Date: 04/13/24  Certification Period Start Date: 06/14/24  Certification Period End Date: 09/12/24  Rehab Potential: Good  Plan of Care Agreement: Patient    Current Problem:   1. Closed fracture of right tibia and fibula, with routine healing, subsequent encounter  Follow Up In Physical Therapy      2. Tibia/fibula fracture, right, closed, initial encounter  Referral to Physical Therapy          Subjective    General:  General  Reason for Referral: S/P R tb/fib FX  Referred By: Donell  General Comment: 1/9 with auth  Precautions:  Lala MILLER Fall Risk Score (The score of 4 or more indicates an increased risk of falling): 4  Braces Applied: brace given by DR Reeves Comment: low fall risk; FM; knee/neck pain; A-fib; lumb DJD;  "DB/neuro; low fall risk;  avoid stairs if possible;  wear brace with WBing    Pain:  Pain Assessment  Pain Assessment: 0-10  Pain Score: 5 - Moderate pain (max sx)  Pain Location: Ankle  Pain Orientation: Right    Prior Level of Function:  Prior Function Per Pt/Caregiver Report  Vocational: Full time employment ()    Objective     Outcome Measures:  Other Measures  Lower Extremity Funtional Score (LEFS): 62     Treatments:  Ankle pumps x20  DF strap stretch 10 x 5 count hold ea   Ongoing clinic PT to include:continue with open to closed chain ROM/PRE as reema;  Wbing with brace only    OP EDUCATION:  Access Code: [email or web address]  URL: [email or web address]  Date: 06/14/2024  Prepared by: César Braden    Exercises  - Supine Single Leg Ankle Pumps   - Seated Calf Stretch with Strap     Goals:  Active       PT Problem       PT Goal 1       Start:  06/14/24    Expected End:  09/12/24       1. Independent HEP to allow for 50% reduction in max ADL C/C sx ( 5/10) 2-3wks  2. Survey score improvement from 62/80 to 70/80 (LEFS) 3-4wks  3. ROM increase to allow for improved ADL STS (from 1 to 10 deg active DF R) 3-4wks  4. Strength increase to allow for improved ADL stairs (from 4-/5 to 4+/5 R ankle open chain) 3-4wks         PT Goal 2       Start:  06/14/24    Expected End:  09/12/24       1.\"I want my ankle to feel better\".             ANKLE    Ankle Observation  Observation Comment: reduced R stance; reduced R push-off with gait      Ankle AROM WFL unless documented below  R ankle dorsiflexion: (10°): 1  L ankle dorsiflexion: (10°): 10  R ankle plantarflexion: (40°): WFL  L ankle plantarflexion: (40°): WFL  R ankle inversion: (30°): WFL  L ankle inversion: (30°): WFL  R ankle eversion: (20°): WFL  L ankle eversion: (20°): WFL       Lower Extremity Strength:WFL unless documented  MMT 5/5 max  RIGHT LEFT                       Knee Extension     5/5    5 /5   Knee Flexion     5/5     5/5   Ankle DF   "  4- /5    5 /5   Ankle PF    4- /5     5/5   Ankle INV    4- /5    5 /5   Ankle EV    4- /5     5/5

## 2024-06-21 ENCOUNTER — HOSPITAL ENCOUNTER (OUTPATIENT)
Dept: RADIOLOGY | Facility: CLINIC | Age: 49
Discharge: HOME | End: 2024-06-21
Payer: MEDICAID

## 2024-06-21 ENCOUNTER — APPOINTMENT (OUTPATIENT)
Dept: ORTHOPEDIC SURGERY | Facility: CLINIC | Age: 49
End: 2024-06-21
Payer: MEDICAID

## 2024-06-21 DIAGNOSIS — S82.201A TIBIA/FIBULA FRACTURE, RIGHT, CLOSED, INITIAL ENCOUNTER: ICD-10-CM

## 2024-06-21 DIAGNOSIS — S82.201A TIBIA/FIBULA FRACTURE, RIGHT, CLOSED, INITIAL ENCOUNTER: Primary | ICD-10-CM

## 2024-06-21 DIAGNOSIS — S82.401A TIBIA/FIBULA FRACTURE, RIGHT, CLOSED, INITIAL ENCOUNTER: Primary | ICD-10-CM

## 2024-06-21 DIAGNOSIS — S82.401A TIBIA/FIBULA FRACTURE, RIGHT, CLOSED, INITIAL ENCOUNTER: ICD-10-CM

## 2024-06-21 PROCEDURE — 73610 X-RAY EXAM OF ANKLE: CPT | Mod: RT

## 2024-06-21 PROCEDURE — 3008F BODY MASS INDEX DOCD: CPT | Performed by: NURSE PRACTITIONER

## 2024-06-21 PROCEDURE — 99213 OFFICE O/P EST LOW 20 MIN: CPT | Performed by: NURSE PRACTITIONER

## 2024-06-21 PROCEDURE — 1036F TOBACCO NON-USER: CPT | Performed by: NURSE PRACTITIONER

## 2024-06-21 ASSESSMENT — ENCOUNTER SYMPTOMS
HEMATOLOGIC/LYMPHATIC NEGATIVE: 1
CONSTITUTIONAL NEGATIVE: 1
ENDOCRINE NEGATIVE: 1
RESPIRATORY NEGATIVE: 1
PSYCHIATRIC NEGATIVE: 1
CARDIOVASCULAR NEGATIVE: 1
ARTHRALGIAS: 1

## 2024-06-21 ASSESSMENT — PAIN SCALES - GENERAL: PAINLEVEL_OUTOF10: 0 - NO PAIN

## 2024-06-21 ASSESSMENT — PAIN - FUNCTIONAL ASSESSMENT: PAIN_FUNCTIONAL_ASSESSMENT: 0-10

## 2024-06-21 NOTE — PROGRESS NOTES
Subjective    Patient ID: Inge Vance is a 48 y.o. female.    Chief Complaint:   Chief Complaint   Patient presents with    Right Ankle - Follow-up, Fracture     Patient is here for follow up of TIB/FIB FX that occurred on 04/13/2024. Patient has x-rays just prior to this appointment. She wears her ankle brace as needed       HPI:    Inge is a pleasant 48-year-old female presenting today for FUV of a right ankle injury with fracture distal fib and questionable distal tib, possible syndesmosis injury.  Patient tripped over a cement curb at a gas station on 4/13/2024.  Patient has been using ankle speed brace since last visit with good fit and support, off with rest and sleep.  Patient started PT recently and has scheduled through the next 1 month.  Overall improvement of 90% at this time.  Tylenol on as needed basis.  No new injuries    Review of Systems   Constitutional: Negative.    HENT: Negative.     Respiratory: Negative.     Cardiovascular: Negative.    Endocrine: Negative.    Musculoskeletal:  Positive for arthralgias and gait problem.   Skin: Negative.    Hematological: Negative.    Psychiatric/Behavioral: Negative.         Objective   Right Ankle Exam     Tenderness   The patient is experiencing tenderness in the ATF.  Swelling: mild (Mild to moderate lateral swelling, improved since last visit)    Range of Motion   Dorsiflexion:  5   Plantar flexion:  40   Eversion:  20   Inversion:  30     Tests   Anterior drawer: negative  Varus tilt: negative    Other   Erythema: absent  Sensation: normal  Pulse: present     Comments:  Patient denies navicular pain, Achilles soft nontender, negative for Gaona's test.  Negative squeeze test of mid to distal lower extremity    Full ROM of distal joints with no sx aggravation, distal motor and sensory intact, cap refill at 2 seconds.  Ambulates with mild limp            Image Results:  XR ankle right 3+ views  Narrative: Interpreted By:  Vishal Daniel,    STUDY:  XR ANKLE RIGHT 3+ VIEWS; ;  5/28/2024 3:42 pm      INDICATION:  Signs/Symptoms:S/P FRACTURE, DATE OF INJURY 04/13/2024.      COMPARISON:  05/10/2024      ACCESSION NUMBER(S):  [id number]      ORDERING CLINICIAN:  REID CHAVEZ      FINDINGS:  Right ankle, three views      Redemonstration of a subacute mildly displaced lateral malleolar  avulsion fracture. Redemonstration of a subacute to remote avulsion  fracture at the medial portion of the talus. Sclerosis in the medial  talar dome suggestive of underlying osteochondral defect. Mild  degenerative change of the ankle. Soft tissue edema about the ankle.      Impression: Subacute lateral malleolar avulsion fracture. Subacute to remote to  the medial process of the talus avulsion fracture. Appearance is  similar to the prior.          MACRO:  None      Signed by: Vishal Daniel 5/29/2024 6:00 PM  Dictation workstation:   [id number]    Independent review of ankle imaging completed during today's visit, distal fibula fracture with alignment maintained and  callus formation.  There is no evidence of a distal tibia fracture.  Ankle mortise is intact.  Await radiology report.    Assessment/Plan   Encounter Diagnoses:    Problem List Items Addressed This Visit             ICD-10-CM    Tibia/fibula fracture, right, closed, initial encounter - Primary S82.201A, S82.401A     Sx control with OTC NSAIDs per package directions, take with food, Tylenol prn.  Elevation and ice  Continue ankle speed brace with weightbearing activity, okay to remove with rest and sleep.  Continue PT and home exercises as provided  Follow-up here in approximately 4 weeks with repeat imaging, sooner for changes or concerns.  Same work restrictions continued  This note was generated using Dragon software.  It may contain errors in wording, punctuation or spelling.         Relevant Orders    XR ankle right 3+ views

## 2024-06-22 NOTE — ASSESSMENT & PLAN NOTE
Sx control with OTC NSAIDs per package directions, take with food, Tylenol prn.  Elevation and ice  Continue ankle speed brace with weightbearing activity, okay to remove with rest and sleep.  Continue PT and home exercises as provided  Follow-up here in approximately 4 weeks with repeat imaging, sooner for changes or concerns.  Same work restrictions continued  This note was generated using Dragon software.  It may contain errors in wording, punctuation or spelling.

## 2024-06-25 ENCOUNTER — APPOINTMENT (OUTPATIENT)
Dept: RADIOLOGY | Facility: CLINIC | Age: 49
End: 2024-06-25
Payer: MEDICAID

## 2024-06-26 ENCOUNTER — TREATMENT (OUTPATIENT)
Dept: PHYSICAL THERAPY | Facility: CLINIC | Age: 49
End: 2024-06-26
Payer: MEDICAID

## 2024-06-26 DIAGNOSIS — S82.401D CLOSED FRACTURE OF RIGHT TIBIA AND FIBULA, WITH ROUTINE HEALING, SUBSEQUENT ENCOUNTER: ICD-10-CM

## 2024-06-26 DIAGNOSIS — S82.201D CLOSED FRACTURE OF RIGHT TIBIA AND FIBULA, WITH ROUTINE HEALING, SUBSEQUENT ENCOUNTER: ICD-10-CM

## 2024-06-26 PROCEDURE — 97110 THERAPEUTIC EXERCISES: CPT | Mod: GP

## 2024-06-26 ASSESSMENT — PAIN - FUNCTIONAL ASSESSMENT: PAIN_FUNCTIONAL_ASSESSMENT: 0-10

## 2024-06-26 ASSESSMENT — PAIN SCALES - GENERAL: PAINLEVEL_OUTOF10: 0 - NO PAIN

## 2024-06-26 NOTE — PROGRESS NOTES
Physical Therapy Treatment    Patient Name: Inge Vance  MRN: [id number]  Today's Date: 2024  Time Calculation  Start Time: 704  Stop Time: 736  Time Calculation (min): 32 min      PT Therapeutic Procedures Time Entry  Therapeutic Exercise Time Entry: 32                         General:  General  Reason for Referral: S/P R tb/fib FX  Referred By: Donell  General Comment:  with auth      Current Problem  Problem List Items Addressed This Visit             ICD-10-CM    Closed fracture of right tibia and fibula, with routine healing, subsequent encounter S82.201D, S82.401D         Assessment:Patient identity confirmed today with name/. ;  ( 0 ) falls since last clinic visit;  she still needs to shift weight with stdg to avoid sx onset; she will see the DR next month and F/U films will be done to confirm ongoing healing;  she arrived with brace in place today; added to HEP and given handout;         Plan:  Treatment/Interventions: Aquatic therapy, Cryotherapy, Education/ Instruction, Gait training, Manual therapy, Neuromuscular re-education, Self care/ home management, Therapeutic exercises, Other (comment) (IASTM/cupping)  PT Plan: Skilled PT  PT Frequency: 2 times per week  Duration: 4wks  Onset Date: 24  Certification Period Start Date: 24  Certification Period End Date: 24  Rehab Potential: Good  Plan of Care Agreement: Patient  Continue with clinic rehab treatments toward functional goals ( normalize gait)    Subjective: I have  0 /10 pain right now.   The Dr liked my ROM.      Precautions  Precautions  Braces Applied: brace given by DR Reeves Comment: low fall risk; FM; knee/neck pain; A-fib; lumb DJD; DB/neuro; low fall risk;  avoid stairs if possible;  wear brace with WBing      Pain  Pain Assessment: 0-10  0-10 (Numeric) Pain Score: 0 - No pain  Pain Location: Ankle  Pain Orientation: Right    Objective    Manual:__0___min    There ex:_32____min  Ankle pumps x20  DF strap  "stretch 10 x 5 count hold ea  Open chain PRE x4 dir 2x10 (manual today)  Stdg BHR/BTR (partial ROM) 2x10  SLS on airex 215\"   Ongoing clinic PT to include:continue with open to closed chain ROM/PRE as reema;  Wbing with brace only    OP EDUCATION:  Access Code: [email or web address]  URL: [email or web address]  Date: 06/14/2024  Prepared by: César Braden    Exercises  - Supine Single Leg Ankle Pumps   - Seated Calf Stretch with Strap     OP EDUCATION:  Access Code: [email or web address]  URL: [email or web address]  Date: 06/26/2024  Prepared by: César Braden    Exercises  - Long Sitting Ankle Eversion with Resistance   - Long Sitting Ankle Plantar Flexion with Resistance   - Long Sitting Ankle Dorsiflexion with Anchored Resistance   - Long Sitting Ankle Inversion with Resistance   - Heel Raise   - Toe Raises with Counter Support   - Standing Single Leg Stance with Counter Support     Goals:  Active       PT Problem       PT Goal 1       Start:  06/14/24    Expected End:  09/12/24       1. Independent HEP to allow for 50% reduction in max ADL C/C sx ( 5/10) 2-3wks  2. Survey score improvement from 62/80 to 70/80 (LEFS) 3-4wks  3. ROM increase to allow for improved ADL STS (from 1 to 10 deg active DF R) 3-4wks  4. Strength increase to allow for improved ADL stairs (from 4-/5 to 4+/5 R ankle open chain) 3-4wks         PT Goal 2       Start:  06/14/24    Expected End:  09/12/24       1.\"I want my ankle to feel better\".            "

## 2024-07-08 ENCOUNTER — APPOINTMENT (OUTPATIENT)
Dept: PHYSICAL THERAPY | Facility: CLINIC | Age: 49
End: 2024-07-08
Payer: MEDICAID

## 2024-07-12 ENCOUNTER — TREATMENT (OUTPATIENT)
Dept: PHYSICAL THERAPY | Facility: CLINIC | Age: 49
End: 2024-07-12
Payer: MEDICAID

## 2024-07-12 ENCOUNTER — DOCUMENTATION (OUTPATIENT)
Dept: PHYSICAL THERAPY | Facility: CLINIC | Age: 49
End: 2024-07-12

## 2024-07-12 ENCOUNTER — HOSPITAL ENCOUNTER (OUTPATIENT)
Dept: RADIOLOGY | Facility: CLINIC | Age: 49
Discharge: HOME | End: 2024-07-12
Payer: MEDICAID

## 2024-07-12 ENCOUNTER — APPOINTMENT (OUTPATIENT)
Dept: ORTHOPEDIC SURGERY | Facility: CLINIC | Age: 49
End: 2024-07-12
Payer: MEDICAID

## 2024-07-12 DIAGNOSIS — S82.401A TIBIA/FIBULA FRACTURE, RIGHT, CLOSED, INITIAL ENCOUNTER: Primary | ICD-10-CM

## 2024-07-12 DIAGNOSIS — S82.201D CLOSED FRACTURE OF RIGHT TIBIA AND FIBULA, WITH ROUTINE HEALING, SUBSEQUENT ENCOUNTER: ICD-10-CM

## 2024-07-12 DIAGNOSIS — S82.201A TIBIA/FIBULA FRACTURE, RIGHT, CLOSED, INITIAL ENCOUNTER: ICD-10-CM

## 2024-07-12 DIAGNOSIS — S82.201A TIBIA/FIBULA FRACTURE, RIGHT, CLOSED, INITIAL ENCOUNTER: Primary | ICD-10-CM

## 2024-07-12 DIAGNOSIS — S82.401D CLOSED FRACTURE OF RIGHT TIBIA AND FIBULA, WITH ROUTINE HEALING, SUBSEQUENT ENCOUNTER: ICD-10-CM

## 2024-07-12 DIAGNOSIS — S82.401A TIBIA/FIBULA FRACTURE, RIGHT, CLOSED, INITIAL ENCOUNTER: ICD-10-CM

## 2024-07-12 PROCEDURE — 3008F BODY MASS INDEX DOCD: CPT | Performed by: NURSE PRACTITIONER

## 2024-07-12 PROCEDURE — 99213 OFFICE O/P EST LOW 20 MIN: CPT | Performed by: NURSE PRACTITIONER

## 2024-07-12 PROCEDURE — 73610 X-RAY EXAM OF ANKLE: CPT | Mod: RT

## 2024-07-12 PROCEDURE — 97110 THERAPEUTIC EXERCISES: CPT | Mod: GP

## 2024-07-12 PROCEDURE — 1036F TOBACCO NON-USER: CPT | Performed by: NURSE PRACTITIONER

## 2024-07-12 ASSESSMENT — PAIN - FUNCTIONAL ASSESSMENT
PAIN_FUNCTIONAL_ASSESSMENT: 0-10
PAIN_FUNCTIONAL_ASSESSMENT: NO/DENIES PAIN

## 2024-07-12 ASSESSMENT — ENCOUNTER SYMPTOMS
HEMATOLOGIC/LYMPHATIC NEGATIVE: 1
ARTHRALGIAS: 1
ENDOCRINE NEGATIVE: 1
PSYCHIATRIC NEGATIVE: 1
RESPIRATORY NEGATIVE: 1
CARDIOVASCULAR NEGATIVE: 1
CONSTITUTIONAL NEGATIVE: 1

## 2024-07-12 ASSESSMENT — PAIN SCALES - GENERAL
PAINLEVEL_OUTOF10: 0 - NO PAIN
PAINLEVEL_OUTOF10: 0 - NO PAIN

## 2024-07-12 NOTE — PROGRESS NOTES
"Physical Therapy Treatment    Patient Name: Inge Vance  MRN: [id number]  Today's Date: 2024  Time Calculation  Start Time: 1331  Stop Time: 1358  Time Calculation (min): 27 min      PT Therapeutic Procedures Time Entry  Therapeutic Exercise Time Entry: 27                         General:  General  Reason for Referral: S/P R tb/fib FX  Referred By: Donell  General Comment: 3/9 with auth      Current Problem  Problem List Items Addressed This Visit             ICD-10-CM    Closed fracture of right tibia and fibula, with routine healing, subsequent encounter S82.201D, S82.401D         Assessment:Patient identity confirmed today with name/. ;  ( 0 ) falls since last clinic visit; 30\" SLS L-1 correction; R-0 corrections; 4/5 R closed chain strength; full AROM; added to HEP and given handout; march on balls of feet mildly uncomfortable for the patient      Plan:  Treatment/Interventions: Aquatic therapy, Cryotherapy, Education/ Instruction, Gait training, Manual therapy, Neuromuscular re-education, Self care/ home management, Therapeutic exercises, Other (comment) (IASTM/cupping)  PT Plan: Skilled PT  PT Frequency: 2 times per week  Duration: 4wks  Onset Date: 24  Certification Period Start Date: 24  Certification Period End Date: 24  Rehab Potential: Good  Plan of Care Agreement: Patient  Continue with clinic rehab treatments toward functional goals ( normalized gait);  the patient has agreed to recheck in 2 wks PRN-HEP emphasized)    Subjective: I have   0/10 pain right now.   I saw Donell today.  She said taking the brace off would be up to you.  I am back to full normal work duties.  I can recheck with her PRN in 1 mth.  She said my films show complete healing; B symmetrical active DF      Precautions  Precautions  Braces Applied: brace given by DR Reeves Comment: low fall risk; FM; knee/neck pain; A-fib; lumb DJD; DB/neuro; low fall risk;  avoid stairs if possible;  wear brace with " "WBing      Pain  Pain Assessment: 0-10  0-10 (Numeric) Pain Score: 0 - No pain  Pain Location: Ankle  Pain Orientation: Right    Objective  Good ROM, strength, and ADL tolerance improvements    There ex:_32____min  Ankle pumps x20  DF strap stretch 10 x 5 count hold ea  Open chain PRE x4 dir 2x10 (manual today)  Stdg BHR/BTR (partial ROM) 2x10  SLS on airex 215\"   Ongoing clinic PT to include:continue with open to closed chain ROM/PRE as reema;  Wbing with brace only    OP EDUCATION:  Access Code: [email or web address]  URL: [email or web address]  Date: 06/14/2024  Prepared by: César Braden    Exercises  - Supine Single Leg Ankle Pumps   - Seated Calf Stretch with Strap     OP EDUCATION:  Access Code: [email or web address]  URL: [email or web address]  Date: 06/26/2024  Prepared by: César Braden    Exercises  - Long Sitting Ankle Eversion with Resistance   - Long Sitting Ankle Plantar Flexion with Resistance   - Long Sitting Ankle Dorsiflexion with Anchored Resistance   - Long Sitting Ankle Inversion with Resistance   - Heel Raise   - Toe Raises with Counter Support   - Standing Single Leg Stance with Counter Support     OP EDUCATION:  Access Code: [email or web address]  URL: [email or web address]  Date: 07/12/2024  Prepared by: César Brdaen    Exercises  - Standing Single Leg Stance with Counter Support   - Heel Raise   - Heel Walking   - Toe Walking   - Single Leg Heel Raise     Goals:  Active       PT Problem       PT Goal 1       Start:  06/14/24    Expected End:  09/12/24       1. Independent HEP to allow for 50% reduction in max ADL C/C sx ( 5/10) 2-3wks 0/10 max sx within the last 5 days; 7/12/24; MET  2. Survey score improvement from 62/80 to 70/80 (LEFS) 3-4wks  3. ROM increase to allow for improved ADL STS (from 1 to 10 deg active DF R) 3-4wks 10 deg active DF; notes ADL improvements; 7/12/24; MET  4. Strength increase to allow for improved ADL stairs (from 4-/5 to 4+/5 R " "ankle open chain) 3-4wks 4/5 open chain R PF; notes improved stairs; 7/12/24; PARTIALLY MET          PT Goal 2       Start:  06/14/24    Expected End:  09/12/24       1.\"I want my ankle to feel better\".\"My ankle feels much better\".; 7/12/24; MET            "

## 2024-07-12 NOTE — PROGRESS NOTES
Physical Therapy                 Therapy Communication Note    Patient Name: Inge Vance  MRN: [id number]  Today's Date: 7/12/2024     Discipline: Physical Therapy    Missed Visit Reason:      Missed Time: No Show    Comment: attempted call-no answer

## 2024-07-12 NOTE — PROGRESS NOTES
Subjective    Patient ID: Inge Vance is a 48 y.o. female.    Chief Complaint:   Chief Complaint   Patient presents with    Right Ankle - Follow-up, Fracture     Patient is here for follow up of TIB/FIB FX that occurred on 04/13/2024. Patient has x-rays just prior to this appointment. She wears her ankle brace as needed       HPI:    Inge is a pleasant 48-year-old female presenting today for FUV of a right ankle injury with fracture distal fib and questionable distal tib, possible syndesmosis injury.  Patient tripped over a cement curb at a gas station on 4/13/2024.  Patient has been using ankle speed brace with good fit and support, off with rest and sleep.  Patient has attended a few PT sessions with some symptom improvement.  Patient is inquiring about DC use of the speed brace.    Overall improvement of 90% at this time.  Tylenol on as needed basis.  No new injuries    Review of Systems   Constitutional: Negative.    HENT: Negative.     Respiratory: Negative.     Cardiovascular: Negative.    Endocrine: Negative.    Musculoskeletal:  Positive for arthralgias and gait problem.   Skin: Negative.    Hematological: Negative.    Psychiatric/Behavioral: Negative.         Objective   Right Ankle Exam     Tenderness   Right ankle tenderness location: None.  Swelling: mild (Mild to moderate lateral swelling, improved since last visit)    Range of Motion   Dorsiflexion:  20   Plantar flexion:  50   Eversion:  20   Inversion:  40     Tests   Anterior drawer: negative  Varus tilt: negative    Other   Erythema: absent  Sensation: normal  Pulse: present     Comments:  Patient denies navicular pain, Achilles soft nontender, negative for Gaona's test.  Negative squeeze test of mid to distal lower extremity  Minimal edema remains to lateral ankle, soft and nontender  Full ROM of distal joints with no sx aggravation, distal motor and sensory intact, cap refill at 2 seconds.  Ambulates with minimal limp  No tenderness with  testing and palpation of tendons and ligaments.  Ankle remains mildly weakened            Image Results:  XR ankle right 3+ views  Interpreted By:  Rodney Toribio,   STUDY:  XR ANKLE RIGHT 3+ VIEWS;  6/21/2024 10:42 am      INDICATION:  Signs/Symptoms:fracture follow up.      COMPARISON:  05/20/2024 right ankle radiographs      ACCESSION NUMBER(S):  [id number]      ORDERING CLINICIAN:  REID CHAVEZ      FINDINGS:  Mild ankle osteoarthrosis. Unchanged redemonstration lateral  malleolar minimally displaced fracture. Improved soft tissue edema.  Ankle mortise maintained. Lucency medial aspect of the talar dome may  represent a osteochondral defect. Small plantar calcaneal spur. Pes  planus. Mild midfoot osteoarthrosis. Unchanged appearance of avulsion  fracture of the medial aspect of the talus.      IMPRESSION  Unchanged redemonstration of minimally displaced lateral malleolar  tip fracture. Interval improved overlying soft tissue edema.          MACRO  none      Signed by: Rodney Toribio 6/22/2024 11:38 PM  Dictation workstation:   [id number]    Independent review of ankle imaging completed during today's visit, distal fibula fracture with alignment maintained and  callus formation.  There is no evidence of a distal tibia fracture.  Ankle mortise is intact.  Await radiology report.    Reviewed PT note on date of visit: Patient was able to reschedule this morning's missed appointment for this afternoon for eval visit    Assessment/Plan   Encounter Diagnoses:    Problem List Items Addressed This Visit             ICD-10-CM    Tibia/fibula fracture, right, closed, initial encounter - Primary S82.201A, S82.401A     We reviewed sx control with OTC NSAIDs per package directions, take with food, Tylenol prn.  Elevation and ice as needed for symptom flares  Patient may begin to wean out of the ankle speed brace, start with light activity and continue to wean as long as symptoms do not become aggravated.  Keep PT  appointment as scheduled for today and keep with home exercises including resistance bands for strengthening  Anticipate weaning out of the brace over the next 2 weeks, may wear for increased activity such as prolonged walking activity, climbing, walking on uneven ground on as needed basis  Continue ankle speed brace with weightbearing activity, okay to remove with rest and sleep.  Continue PT and home exercises as provided  Follow-up here in approximately 4 weeks with no imaging anticipated, sooner for changes or concerns.  May return to work full duty as tolerated, written note provided  This note was generated using Dragon software.  It may contain errors in wording, punctuation or spelling.

## 2024-07-12 NOTE — ASSESSMENT & PLAN NOTE
We reviewed sx control with OTC NSAIDs per package directions, take with food, Tylenol prn.  Elevation and ice as needed for symptom flares  Patient may begin to wean out of the ankle speed brace, start with light activity and continue to wean as long as symptoms do not become aggravated.  Keep PT appointment as scheduled for today and keep with home exercises including resistance bands for strengthening  Anticipate weaning out of the brace over the next 2 weeks, may wear for increased activity such as prolonged walking activity, climbing, walking on uneven ground on as needed basis  Continue ankle speed brace with weightbearing activity, okay to remove with rest and sleep.  Continue PT and home exercises as provided  Follow-up here in approximately 4 weeks with no imaging anticipated, sooner for changes or concerns.  May return to work full duty as tolerated, written note provided  This note was generated using Dragon software.  It may contain errors in wording, punctuation or spelling.

## 2024-07-15 ENCOUNTER — APPOINTMENT (OUTPATIENT)
Dept: PHYSICAL THERAPY | Facility: CLINIC | Age: 49
End: 2024-07-15
Payer: MEDICAID

## 2024-08-05 ENCOUNTER — APPOINTMENT (OUTPATIENT)
Dept: PRIMARY CARE | Facility: CLINIC | Age: 49
End: 2024-08-05
Payer: MEDICAID

## 2024-08-05 VITALS
HEART RATE: 80 BPM | SYSTOLIC BLOOD PRESSURE: 152 MMHG | DIASTOLIC BLOOD PRESSURE: 74 MMHG | BODY MASS INDEX: 39.65 KG/M2 | WEIGHT: 238 LBS | HEIGHT: 65 IN

## 2024-08-05 DIAGNOSIS — Z12.31 ENCOUNTER FOR SCREENING MAMMOGRAM FOR MALIGNANT NEOPLASM OF BREAST: ICD-10-CM

## 2024-08-05 DIAGNOSIS — R53.82 CHRONIC FATIGUE: ICD-10-CM

## 2024-08-05 DIAGNOSIS — F41.9 ANXIETY: ICD-10-CM

## 2024-08-05 DIAGNOSIS — Z78.0 POSTMENOPAUSAL: ICD-10-CM

## 2024-08-05 DIAGNOSIS — G47.00 INSOMNIA, UNSPECIFIED TYPE: ICD-10-CM

## 2024-08-05 DIAGNOSIS — Z13.220 SCREENING FOR HYPERLIPIDEMIA: ICD-10-CM

## 2024-08-05 DIAGNOSIS — G43.709 CHRONIC MIGRAINE WITHOUT AURA WITHOUT STATUS MIGRAINOSUS, NOT INTRACTABLE: Primary | ICD-10-CM

## 2024-08-05 DIAGNOSIS — I15.2 HYPERTENSION ASSOCIATED WITH DIABETES (MULTI): ICD-10-CM

## 2024-08-05 DIAGNOSIS — F32.A MODERATE DEPRESSIVE DISORDER: ICD-10-CM

## 2024-08-05 DIAGNOSIS — J30.2 SEASONAL ALLERGIES: ICD-10-CM

## 2024-08-05 DIAGNOSIS — E11.59 HYPERTENSION ASSOCIATED WITH DIABETES (MULTI): ICD-10-CM

## 2024-08-05 DIAGNOSIS — I48.91 ATRIAL FIBRILLATION WITH RVR (MULTI): ICD-10-CM

## 2024-08-05 DIAGNOSIS — E11.9 DIET-CONTROLLED TYPE 2 DIABETES MELLITUS (MULTI): ICD-10-CM

## 2024-08-05 PROCEDURE — 99214 OFFICE O/P EST MOD 30 MIN: CPT | Performed by: INTERNAL MEDICINE

## 2024-08-05 PROCEDURE — 3077F SYST BP >= 140 MM HG: CPT | Performed by: INTERNAL MEDICINE

## 2024-08-05 PROCEDURE — 3078F DIAST BP <80 MM HG: CPT | Performed by: INTERNAL MEDICINE

## 2024-08-05 PROCEDURE — 1036F TOBACCO NON-USER: CPT | Performed by: INTERNAL MEDICINE

## 2024-08-05 PROCEDURE — 3008F BODY MASS INDEX DOCD: CPT | Performed by: INTERNAL MEDICINE

## 2024-08-05 RX ORDER — SUMATRIPTAN 50 MG/1
50 TABLET, FILM COATED ORAL 2 TIMES DAILY PRN
Qty: 9 TABLET | Refills: 11 | Status: SHIPPED | OUTPATIENT
Start: 2024-08-05

## 2024-08-05 RX ORDER — PAROXETINE HYDROCHLORIDE 20 MG/1
20 TABLET, FILM COATED ORAL DAILY
Qty: 30 TABLET | Refills: 11 | Status: SHIPPED | OUTPATIENT
Start: 2024-08-05

## 2024-08-05 RX ORDER — FLUTICASONE PROPIONATE 50 MCG
2 SPRAY, SUSPENSION (ML) NASAL DAILY
Qty: 16 G | Refills: 11 | Status: SHIPPED | OUTPATIENT
Start: 2024-08-05

## 2024-08-05 RX ORDER — METOPROLOL TARTRATE 50 MG/1
50 TABLET ORAL 2 TIMES DAILY
Qty: 60 TABLET | Refills: 11 | Status: SHIPPED | OUTPATIENT
Start: 2024-08-05 | End: 2025-08-05

## 2024-08-05 RX ORDER — TRAZODONE HYDROCHLORIDE 50 MG/1
50 TABLET ORAL NIGHTLY
Qty: 30 TABLET | Refills: 11 | Status: SHIPPED | OUTPATIENT
Start: 2024-08-05 | End: 2025-08-05

## 2024-08-05 ASSESSMENT — ENCOUNTER SYMPTOMS
SLEEP DISTURBANCE: 1
DIZZINESS: 0
MUSCULOSKELETAL NEGATIVE: 1
CHILLS: 0
NAUSEA: 0
SHORTNESS OF BREATH: 0
HEADACHES: 1
ABDOMINAL DISTENTION: 0
EYES NEGATIVE: 1
WEAKNESS: 0
DIARRHEA: 0
GASTROINTESTINAL NEGATIVE: 1
CARDIOVASCULAR NEGATIVE: 1
LIGHT-HEADEDNESS: 0
WHEEZING: 0
ABDOMINAL PAIN: 0
AGITATION: 0
ENDOCRINE NEGATIVE: 1
NERVOUS/ANXIOUS: 1
DYSPHORIC MOOD: 1
FEVER: 0
VOMITING: 0
RHINORRHEA: 0
BACK PAIN: 0
DYSURIA: 0
FATIGUE: 1
CONSTIPATION: 0
COUGH: 0
PALPITATIONS: 0

## 2024-08-05 NOTE — PROGRESS NOTES
Subjective   Patient ID: Inge Vance is a 48 y.o. female who presents for Follow-up (C/O B/L LEG PAIN AND COLD ALL THE TIME. C/O INSOMNIA - TYLENOL PM OFFERS LITTLE RELIEF. C/O FREQUENT HEADACHES - WORSENS WITH WEATHER CHANGES. ).  HPI  MED REFILL. WORSENING CHRONIC MIGRAINE , MORE FREQUENT AND MORE SEVERE 5-7/10 ON AND OFF. INSOMNIA ,COULDN'T TOLERATE THE OTC MELATONIN. HAD HALLUCINATION WITH ELAVIL (GIVEN IN THE PAST FOR MIGRAINE). DEPRESSION/ ANXIETY, CURRENTLY GETS COUNSELING , HOT FLUSHES. CHRONIC FATIGUE.  Review of Systems   Constitutional:  Positive for fatigue. Negative for chills and fever.   HENT: Negative.  Negative for congestion, postnasal drip and rhinorrhea.    Eyes: Negative.  Negative for visual disturbance.   Respiratory:  Negative for cough, shortness of breath and wheezing.    Cardiovascular: Negative.  Negative for chest pain, palpitations and leg swelling.   Gastrointestinal: Negative.  Negative for abdominal distention, abdominal pain, constipation, diarrhea, nausea and vomiting.   Endocrine: Negative.    Genitourinary:  Negative for dysuria and urgency.   Musculoskeletal: Negative.  Negative for back pain.   Skin: Negative.  Negative for rash.   Allergic/Immunologic: Negative for immunocompromised state.   Neurological:  Positive for headaches. Negative for dizziness, weakness and light-headedness.   Psychiatric/Behavioral:  Positive for dysphoric mood and sleep disturbance. Negative for agitation. The patient is nervous/anxious.        Objective   Physical Exam  Constitutional:       General: She is not in acute distress.  HENT:      Head: Normocephalic.      Nose: Nose normal.      Mouth/Throat:      Mouth: Mucous membranes are moist.   Eyes:      Conjunctiva/sclera: Conjunctivae normal.      Pupils: Pupils are equal, round, and reactive to light.   Cardiovascular:      Rate and Rhythm: Normal rate and regular rhythm.      Pulses: Normal pulses.      Heart sounds: Normal heart sounds.    Pulmonary:      Effort: No respiratory distress.      Breath sounds: No wheezing.   Chest:      Chest wall: No tenderness.   Abdominal:      General: Abdomen is flat. Bowel sounds are normal.      Palpations: Abdomen is soft.      Tenderness: There is no abdominal tenderness.   Musculoskeletal:         General: No tenderness. Normal range of motion.      Cervical back: Normal range of motion.   Lymphadenopathy:      Cervical: No cervical adenopathy.   Skin:     General: Skin is warm and dry.      Findings: No rash.   Neurological:      General: No focal deficit present.      Mental Status: She is alert. Mental status is at baseline.   Psychiatric:         Mood and Affect: Mood normal.         Behavior: Behavior normal.         Assessment/Plan   1. Chronic migraine without aura without status migrainosus, not intractable  SUMAtriptan (Imitrex) 50 mg tablet      2. Seasonal allergies  fluticasone (Flonase) 50 mcg/actuation nasal spray      3. Hypertension associated with diabetes (Multi)  metoprolol tartrate (Lopressor) 50 mg tablet    Comprehensive Metabolic Panel      4. Diet-controlled type 2 diabetes mellitus (Multi)  Comprehensive Metabolic Panel    Hemoglobin A1C    Albumin-Creatinine Ratio, Urine Random      5. Atrial fibrillation with RVR (Multi)  metoprolol tartrate (Lopressor) 50 mg tablet      6. Moderate depressive disorder  PARoxetine (Paxil) 20 mg tablet      7. Anxiety  PARoxetine (Paxil) 20 mg tablet      8. Insomnia, unspecified type  traZODone (Desyrel) 50 mg tablet      9. Screening for hyperlipidemia  Lipid Panel      10. Chronic fatigue  Comprehensive Metabolic Panel    TSH with reflex to Free T4 if abnormal    Magnesium    Zinc    CBC and Auto Differential      11. Encounter for screening mammogram for malignant neoplasm of breast  BI mammo bilateral screening tomosynthesis      12. Postmenopausal  XR DEXA bone density          HTN addressed as follow:    MONITOR BP   GOAL BP LOWER THAN  130/80  LOW SALT  EXERCISE DAILY. WILL INCREASE METOPROLOL TO 50 MG BID.  Diabetes Mellitus/IFG addressed as follow:    [address]  HGA1C GOAL LESS THAN 7  LOSE WT  EXERCISE DAILY  ADVISED TO AVOID TAKING NAPS DURING THE DAY . WILL START TRAZODONE 50 MG Q HS.  ADVISED FOR RELAXATION TECHNIQUES AND TO CUT DOWN ON CAFFEINE.      MDM    1) COMPLEXITY: 1 UNDIAGNOSED NEW PROBLEM WITH UNCERTAIN PROGNOSIS  2)DATA: TESTS INTERPRETED AND OR ORDERED, TOOK INDEPENDENT HISTORY OR RECORDS REVIEWED  3)RISK: MODERATE RISK DUE TO NATURE OF MEDICAL CONDITIONS/COMORBIDITY OR MEDICATIONS ORDERED OR SURGICAL OR PROCEDURE REFERRAL, .       3 weeks.

## 2024-08-09 ENCOUNTER — TELEPHONE (OUTPATIENT)
Dept: PRIMARY CARE | Facility: CLINIC | Age: 49
End: 2024-08-09

## 2024-08-09 ENCOUNTER — LAB (OUTPATIENT)
Dept: LAB | Facility: LAB | Age: 49
End: 2024-08-09
Payer: MEDICAID

## 2024-08-09 DIAGNOSIS — E11.59 HYPERTENSION ASSOCIATED WITH DIABETES (MULTI): ICD-10-CM

## 2024-08-09 DIAGNOSIS — E11.59 HYPERTENSION ASSOCIATED WITH DIABETES (MULTI): Primary | ICD-10-CM

## 2024-08-09 DIAGNOSIS — I15.2 HYPERTENSION ASSOCIATED WITH DIABETES (MULTI): ICD-10-CM

## 2024-08-09 DIAGNOSIS — Z13.220 SCREENING FOR HYPERLIPIDEMIA: ICD-10-CM

## 2024-08-09 DIAGNOSIS — R53.82 CHRONIC FATIGUE: ICD-10-CM

## 2024-08-09 DIAGNOSIS — I15.2 HYPERTENSION ASSOCIATED WITH DIABETES (MULTI): Primary | ICD-10-CM

## 2024-08-09 DIAGNOSIS — E11.9 DIET-CONTROLLED TYPE 2 DIABETES MELLITUS (MULTI): ICD-10-CM

## 2024-08-09 LAB
ALBUMIN SERPL BCP-MCNC: 4.1 G/DL (ref 3.4–5)
ALP SERPL-CCNC: 51 U/L (ref 33–110)
ALT SERPL W P-5'-P-CCNC: 25 U/L (ref 7–45)
ANION GAP SERPL CALC-SCNC: 10 MMOL/L (ref 10–20)
AST SERPL W P-5'-P-CCNC: 14 U/L (ref 9–39)
BASOPHILS # BLD AUTO: 0.04 X10*3/UL (ref 0–0.1)
BASOPHILS NFR BLD AUTO: 0.6 %
BILIRUB SERPL-MCNC: 0.4 MG/DL (ref 0–1.2)
BUN SERPL-MCNC: 16 MG/DL (ref 6–23)
CALCIUM SERPL-MCNC: 9 MG/DL (ref 8.6–10.3)
CHLORIDE SERPL-SCNC: 113 MMOL/L (ref 98–107)
CHOLEST SERPL-MCNC: 212 MG/DL (ref 0–199)
CHOLESTEROL/HDL RATIO: 4.5
CO2 SERPL-SCNC: 21 MMOL/L (ref 21–32)
CREAT SERPL-MCNC: 0.78 MG/DL (ref 0.5–1.05)
EGFRCR SERPLBLD CKD-EPI 2021: >90 ML/MIN/1.73M*2
EOSINOPHIL # BLD AUTO: 0.24 X10*3/UL (ref 0–0.7)
EOSINOPHIL NFR BLD AUTO: 3.7 %
ERYTHROCYTE [DISTWIDTH] IN BLOOD BY AUTOMATED COUNT: 11.9 % (ref 11.5–14.5)
EST. AVERAGE GLUCOSE BLD GHB EST-MCNC: 103 MG/DL
GLUCOSE SERPL-MCNC: 94 MG/DL (ref 74–99)
HBA1C MFR BLD: 5.2 %
HCT VFR BLD AUTO: 38.6 % (ref 36–46)
HDLC SERPL-MCNC: 47 MG/DL
HGB BLD-MCNC: 12.6 G/DL (ref 12–16)
IMM GRANULOCYTES # BLD AUTO: 0.01 X10*3/UL (ref 0–0.7)
IMM GRANULOCYTES NFR BLD AUTO: 0.2 % (ref 0–0.9)
LDLC SERPL CALC-MCNC: 143 MG/DL
LYMPHOCYTES # BLD AUTO: 2.51 X10*3/UL (ref 1.2–4.8)
LYMPHOCYTES NFR BLD AUTO: 39.1 %
MAGNESIUM SERPL-MCNC: 1.97 MG/DL (ref 1.6–2.4)
MCH RBC QN AUTO: 30.7 PG (ref 26–34)
MCHC RBC AUTO-ENTMCNC: 32.6 G/DL (ref 32–36)
MCV RBC AUTO: 94 FL (ref 80–100)
MONOCYTES # BLD AUTO: 0.38 X10*3/UL (ref 0.1–1)
MONOCYTES NFR BLD AUTO: 5.9 %
NEUTROPHILS # BLD AUTO: 3.24 X10*3/UL (ref 1.2–7.7)
NEUTROPHILS NFR BLD AUTO: 50.5 %
NON HDL CHOLESTEROL: 165 MG/DL (ref 0–149)
NRBC BLD-RTO: 0 /100 WBCS (ref 0–0)
PLATELET # BLD AUTO: 336 X10*3/UL (ref 150–450)
POTASSIUM SERPL-SCNC: 4.1 MMOL/L (ref 3.5–5.3)
PROT SERPL-MCNC: 6.1 G/DL (ref 6.4–8.2)
RBC # BLD AUTO: 4.1 X10*6/UL (ref 4–5.2)
SODIUM SERPL-SCNC: 140 MMOL/L (ref 136–145)
TRIGL SERPL-MCNC: 111 MG/DL (ref 0–149)
TSH SERPL-ACNC: 2.09 MIU/L (ref 0.44–3.98)
VLDL: 22 MG/DL (ref 0–40)
WBC # BLD AUTO: 6.4 X10*3/UL (ref 4.4–11.3)

## 2024-08-09 PROCEDURE — 80061 LIPID PANEL: CPT

## 2024-08-09 PROCEDURE — 36415 COLL VENOUS BLD VENIPUNCTURE: CPT

## 2024-08-09 PROCEDURE — 80053 COMPREHEN METABOLIC PANEL: CPT

## 2024-08-09 PROCEDURE — 84630 ASSAY OF ZINC: CPT

## 2024-08-09 PROCEDURE — 83036 HEMOGLOBIN GLYCOSYLATED A1C: CPT

## 2024-08-09 PROCEDURE — 83735 ASSAY OF MAGNESIUM: CPT

## 2024-08-09 PROCEDURE — 82570 ASSAY OF URINE CREATININE: CPT

## 2024-08-09 PROCEDURE — 82043 UR ALBUMIN QUANTITATIVE: CPT

## 2024-08-09 PROCEDURE — 84443 ASSAY THYROID STIM HORMONE: CPT

## 2024-08-09 PROCEDURE — 85025 COMPLETE CBC W/AUTO DIFF WBC: CPT

## 2024-08-09 NOTE — TELEPHONE ENCOUNTER
EFFIE AT [facility] CALLED - PATIENT IS THERE TO HAVE LABS DONE AND THE ALBUMIN URINE ORDER DR. CLOUD PLACED WAS PUT IN as A CLINIC COLLECT INSTEAD OF HOSPITAL TO COLLECT. SINCE DR. CLOUD IS OUT OF THE OFFICE TODAY, ARE YOU WILLING TO PUT IN NEW ORDER FOR THE HOSPITAL TO PROCESS?

## 2024-08-10 LAB
CREAT UR-MCNC: 256.3 MG/DL (ref 20–320)
MICROALBUMIN UR-MCNC: 67.9 MG/L
MICROALBUMIN/CREAT UR: 26.5 UG/MG CREAT

## 2024-08-12 LAB — ZINC SERPL-MCNC: 76.1 UG/DL (ref 60–120)

## 2024-08-14 ENCOUNTER — DOCUMENTATION (OUTPATIENT)
Dept: PHYSICAL THERAPY | Facility: CLINIC | Age: 49
End: 2024-08-14
Payer: MEDICAID

## 2024-08-14 NOTE — PROGRESS NOTES
Physical Therapy    Discharge Summary    Name: Inge Vance  MRN: [id number]  : 1975  Date: 24    Discharge Summary: PT       Has been seen in clinical physical therapy beginning on   for 3 visit (s); there has been no further visits attended. DC from PT

## 2024-08-16 ENCOUNTER — HOSPITAL ENCOUNTER (OUTPATIENT)
Dept: RADIOLOGY | Facility: CLINIC | Age: 49
Discharge: HOME | End: 2024-08-16
Payer: MEDICAID

## 2024-08-16 ENCOUNTER — APPOINTMENT (OUTPATIENT)
Dept: ORTHOPEDIC SURGERY | Facility: CLINIC | Age: 49
End: 2024-08-16
Payer: MEDICAID

## 2024-08-16 VITALS — HEIGHT: 65 IN | WEIGHT: 238 LBS | BODY MASS INDEX: 39.65 KG/M2

## 2024-08-16 DIAGNOSIS — Z12.31 ENCOUNTER FOR SCREENING MAMMOGRAM FOR MALIGNANT NEOPLASM OF BREAST: ICD-10-CM

## 2024-08-16 DIAGNOSIS — Z78.0 POSTMENOPAUSAL: ICD-10-CM

## 2024-08-16 PROCEDURE — 77080 DXA BONE DENSITY AXIAL: CPT

## 2024-08-16 PROCEDURE — 77067 SCR MAMMO BI INCL CAD: CPT | Performed by: RADIOLOGY

## 2024-08-16 PROCEDURE — 77063 BREAST TOMOSYNTHESIS BI: CPT | Performed by: RADIOLOGY

## 2024-08-16 PROCEDURE — 77067 SCR MAMMO BI INCL CAD: CPT

## 2024-08-16 ASSESSMENT — LIFESTYLE VARIABLES
CURRENT_SMOKER: N
3_OR_MORE_DRINKS_PER_DAY: N

## 2024-08-23 ENCOUNTER — APPOINTMENT (OUTPATIENT)
Dept: PRIMARY CARE | Facility: CLINIC | Age: 49
End: 2024-08-23
Payer: MEDICAID

## 2024-08-23 VITALS — BODY MASS INDEX: 39.65 KG/M2 | HEIGHT: 65 IN | WEIGHT: 238 LBS

## 2024-08-23 DIAGNOSIS — E55.9 VITAMIN D DEFICIENCY: ICD-10-CM

## 2024-08-23 DIAGNOSIS — M25.552 BILATERAL HIP PAIN: ICD-10-CM

## 2024-08-23 DIAGNOSIS — G89.29 NECK PAIN, CHRONIC: ICD-10-CM

## 2024-08-23 DIAGNOSIS — E78.1 HYPERTRIGLYCERIDEMIA: ICD-10-CM

## 2024-08-23 DIAGNOSIS — E11.9 DIET-CONTROLLED TYPE 2 DIABETES MELLITUS (MULTI): ICD-10-CM

## 2024-08-23 DIAGNOSIS — M54.2 NECK PAIN, CHRONIC: ICD-10-CM

## 2024-08-23 DIAGNOSIS — I27.20 PULMONARY HYPERTENSION (MULTI): ICD-10-CM

## 2024-08-23 DIAGNOSIS — J45.30 MILD PERSISTENT ASTHMA, UNSPECIFIED WHETHER COMPLICATED (HHS-HCC): ICD-10-CM

## 2024-08-23 DIAGNOSIS — M54.50 CHRONIC BILATERAL LOW BACK PAIN WITHOUT SCIATICA: ICD-10-CM

## 2024-08-23 DIAGNOSIS — G89.29 CHRONIC BILATERAL LOW BACK PAIN WITHOUT SCIATICA: ICD-10-CM

## 2024-08-23 DIAGNOSIS — E66.01 CLASS 2 SEVERE OBESITY DUE TO EXCESS CALORIES WITH SERIOUS COMORBIDITY AND BODY MASS INDEX (BMI) OF 39.0 TO 39.9 IN ADULT (MULTI): ICD-10-CM

## 2024-08-23 DIAGNOSIS — M25.551 BILATERAL HIP PAIN: ICD-10-CM

## 2024-08-23 DIAGNOSIS — J02.9 PHARYNGITIS, UNSPECIFIED ETIOLOGY: Primary | ICD-10-CM

## 2024-08-23 DIAGNOSIS — E53.8 LOW SERUM VITAMIN B12: ICD-10-CM

## 2024-08-23 PROBLEM — E66.812 CLASS 2 SEVERE OBESITY DUE TO EXCESS CALORIES WITH SERIOUS COMORBIDITY AND BODY MASS INDEX (BMI) OF 39.0 TO 39.9 IN ADULT: Status: ACTIVE | Noted: 2023-04-24

## 2024-08-23 PROBLEM — R52 BODY ACHES: Status: RESOLVED | Noted: 2023-04-28 | Resolved: 2024-08-23

## 2024-08-23 PROCEDURE — 3050F LDL-C >= 130 MG/DL: CPT | Performed by: NURSE PRACTITIONER

## 2024-08-23 PROCEDURE — 3044F HG A1C LEVEL LT 7.0%: CPT | Performed by: NURSE PRACTITIONER

## 2024-08-23 PROCEDURE — 3008F BODY MASS INDEX DOCD: CPT | Performed by: NURSE PRACTITIONER

## 2024-08-23 PROCEDURE — 99214 OFFICE O/P EST MOD 30 MIN: CPT | Performed by: NURSE PRACTITIONER

## 2024-08-23 PROCEDURE — 1036F TOBACCO NON-USER: CPT | Performed by: NURSE PRACTITIONER

## 2024-08-23 PROCEDURE — 3060F POS MICROALBUMINURIA REV: CPT | Performed by: NURSE PRACTITIONER

## 2024-08-23 RX ORDER — ALBUTEROL SULFATE 90 UG/1
1 INHALANT RESPIRATORY (INHALATION) EVERY 4 HOURS PRN
Qty: 18 G | Refills: 11 | Status: SHIPPED | OUTPATIENT
Start: 2024-08-23

## 2024-08-23 RX ORDER — AMOXICILLIN 500 MG/1
500 CAPSULE ORAL EVERY 8 HOURS SCHEDULED
Qty: 21 CAPSULE | Refills: 0 | Status: SHIPPED | OUTPATIENT
Start: 2024-08-23 | End: 2024-08-30

## 2024-08-23 RX ORDER — MOMETASONE FUROATE AND FORMOTEROL FUMARATE DIHYDRATE 200; 5 UG/1; UG/1
2 AEROSOL RESPIRATORY (INHALATION)
Qty: 13 G | Refills: 11 | Status: SHIPPED | OUTPATIENT
Start: 2024-08-23

## 2024-08-23 RX ORDER — FERROUS SULFATE, DRIED 160(50) MG
1 TABLET, EXTENDED RELEASE ORAL 2 TIMES DAILY
Qty: 180 TABLET | Refills: 3 | Status: SHIPPED | OUTPATIENT
Start: 2024-08-23

## 2024-08-23 ASSESSMENT — ENCOUNTER SYMPTOMS
MYALGIAS: 0
HEMATURIA: 0
ARTHRALGIAS: 1
COUGH: 0
UNEXPECTED WEIGHT CHANGE: 0
CONSTIPATION: 0
DIFFICULTY URINATING: 0
VOMITING: 0
JOINT SWELLING: 0
TROUBLE SWALLOWING: 0
HEADACHES: 0
NUMBNESS: 0
SHORTNESS OF BREATH: 0
DIZZINESS: 0
PHOTOPHOBIA: 0
APNEA: 0
DIARRHEA: 0
NAUSEA: 0
CHILLS: 0
WEAKNESS: 0
NECK PAIN: 1
CHOKING: 0
BACK PAIN: 1
BLOOD IN STOOL: 0
FEVER: 0
FATIGUE: 0
NERVOUS/ANXIOUS: 0
WHEEZING: 0
SEIZURES: 0
FREQUENCY: 0
SLEEP DISTURBANCE: 0
ABDOMINAL PAIN: 0
CHEST TIGHTNESS: 0
EYE PAIN: 0
FLANK PAIN: 0
WOUND: 0
DYSURIA: 0
FACIAL ASYMMETRY: 0
ABDOMINAL DISTENTION: 0
PALPITATIONS: 0
SPEECH DIFFICULTY: 0
SORE THROAT: 0
CONFUSION: 0
EYE REDNESS: 0

## 2024-08-23 NOTE — PROGRESS NOTES
"Subjective   Patient ID: Inge Vance is a 48 y.o. female who presents for Follow-up (F/U WITH MAMMO DEXA AND LABS. C/O CONGESTION AND SORE THROAT).    VIRTUAL APPOINTMENT BEING PERFORMED    HPI:  Presents today for C/O NASAL CONGESTION AND SORE THROAT X 1 WEEK  modifying factors consists of NO COVID TEST TAKEN  associated symptoms consist of POST NASAL DRIP AND COUGH. NO SINUS PRESSURE OR PAIN. NO SOB OR CP. NO FEVER  prior treatment consists of medication NONE    C/O NECK PAIN X SEVERAL YEARS THAT HAS GOTTEN WORSE OVER THE PAST FEW MONTHS   modifying factors consists of  NO KNOWN INJURY   associated symptoms consist of  RADIATING PAIN BETWEEN SHOULDER BLADES ON/FF.     prior treatment consists of medication OTC TOPICALS AND TYLENOL     C/O LOW BACK AND B/L HIP PAIN ON/OFF X SEVERAL MONTHS - YEAR   modifying factors consists of   DENIES INJURY  associated symptoms consist of  STANDING OR SITTING FOR EXTENDED PERIOD OF TIME MAKES IT WORSE. NO NUMBNESS TO BUTTOCK OR RADIATING PAIN    prior treatment consists of medication  OTC TOPICALS AND TYLENOL       LIPIDS- SHE IS GETTING GASTRIC BYPASS SURGERY OCT 2024. WILL RECHECK 6 MONTHS AFTER SURGERY     Visit Vitals  Ht 1.651 m (5' 5\")   Wt 108 kg (238 lb)   LMP  (LMP Unknown)   BMI 39.61 kg/m²   OB Status Hysterectomy   Smoking Status Never   BSA 2.23 m²        Review of Systems   Constitutional:  Negative for chills, fatigue, fever and unexpected weight change.   HENT:  Negative for congestion, ear pain, sore throat and trouble swallowing.    Eyes:  Negative for photophobia, pain, redness and visual disturbance.   Respiratory:  Negative for apnea, cough, choking, chest tightness, shortness of breath and wheezing.    Cardiovascular:  Negative for chest pain, palpitations and leg swelling.   Gastrointestinal:  Negative for abdominal distention, abdominal pain, blood in stool, constipation, diarrhea, nausea and vomiting.   Genitourinary:  Negative for difficulty " urinating, dysuria, flank pain, frequency, hematuria and urgency.   Musculoskeletal:  Positive for arthralgias, back pain and neck pain. Negative for gait problem, joint swelling and myalgias.   Skin:  Negative for rash and wound.   Neurological:  Negative for dizziness, seizures, syncope, facial asymmetry, speech difficulty, weakness, numbness and headaches.   Psychiatric/Behavioral:  Negative for confusion, sleep disturbance and suicidal ideas. The patient is not nervous/anxious.        Objective   REVIEWED MAMMO AND BONE DENSITY     LABS REVIEWED    Physical Exam  Neurological:      Mental Status: She is alert and oriented to person, place, and time.   Psychiatric:         Mood and Affect: Mood normal.         Behavior: Behavior normal.         Thought Content: Thought content normal.         Judgment: Judgment normal.            Assessment/Plan   Problem List Items Addressed This Visit       Diet-controlled type 2 diabetes mellitus (Multi)    Relevant Orders    Hemoglobin A1C    Hypertriglyceridemia    Relevant Orders    CBC and Auto Differential    Comprehensive Metabolic Panel    Lipid Panel    TSH with reflex to Free T4 if abnormal    Low serum vitamin B12    Mild persistent asthma (HHS-HCC)    Relevant Medications    mometasone-formoterol (Dulera) 200-5 mcg/actuation inhaler    albuterol 90 mcg/actuation inhaler    Class 2 severe obesity due to excess calories with serious comorbidity and body mass index (BMI) of 39.0 to 39.9 in adult (Multi)    Neck pain, chronic    Relevant Orders    Referral to Physical Therapy    XR cervical spine 2-3 views    XR thoracic spine 3 views    Pulmonary hypertension (Multi)    Vitamin D deficiency    Relevant Medications    calcium carbonate-vitamin D3 (Hi-Luis Plus Vit D) 500 mg-5 mcg (200 unit) tablet    Other Relevant Orders    Vitamin D 25-Hydroxy,Total (for eval of Vitamin D levels)    Parathyroid Hormone, Intact     Other Visit Diagnoses       Pharyngitis, unspecified  etiology    -  Primary    Relevant Medications    amoxicillin (Amoxil) 500 mg capsule    Chronic bilateral low back pain without sciatica        Relevant Orders    Referral to Physical Therapy    XR lumbar spine 6+ views including oblique flexion extension    Bilateral hip pain        Relevant Orders    Referral to Physical Therapy    XR hips bilateral 3 or 4 VW w pelvis when performed        I WILL REFER TO PT. REFUSING SOONER FU APPT AT THIS TIME. RETURN AFTER PT IS PAIN REMAINS    WE DISCUSSED MOST COMMON SIDE EFFECTS OF PRESCRIBED MEDICATIONS. INDICATIONS, RISK, COMPLICATIONS, AND ALTERNATIVES OF MEDICATION/THERAPEUTICS WERE EXPLAINED AND DISCUSSED. PLEASE MONITOR CLOSELY FOR ANY UNTOWARD SIDE EFFECTS OR COMPLICATIONS OF MEDICATIONS. PATIENT IS STRONGLY ADVISED TO BE COMPLIANT WITH RECOMMENDATIONS. QUESTIONS AND CONCERNS WERE ADDRESSED. INSTRUCTED TO CALL, RETURN SOONER, OR GO TO THE ER,  IF SYMPTOMS PERSIST OR WORSEN. THEY VOICED UNDERSTANDING AND  DENIES FURTHER QUESTIONS AT THIS TIME.  TIME CODE  1. PREPARATION FOR PATIENT'S VISIT (REVIEWING CHART, CURRENT MEDICAL RECORDS, OUTSIDE HEALTH PROVIDER RECORDS, PREVIOUS HISTORY, EXAM, TEST, PROCEDURE, AND MEDICATIONS)  2. FACE TO FACE ENCOUNTER OBTAINING HISTORY FROM THE PATIENT/FAMILY/CAREGIVERS; PERFORMING EVALUATION AND EXAMINATION; ORDERING TESTS OR PROCEDURES; REFERRING AND COMMUNICATING WITH OTHER HEALTHCARE PROVIDERS; COUNSELING AND EDUCATION OF THE PATIENT/FAMILY/CAREGIVERS; INDEPENDENTLY INTERPRETING RESULTS (TESTS, LABS, PROCEDURES, IMAGING) AND COMMUNICATING AND EXPLAINING RESULTS TO THE PATIENT/FAMILY/CAREGIVERS  3. COORDINATION OF CARE; PREPARING AND PRINTING DISCHARGE INSTRUCTIONS AND ANY EDUCATIONAL MATERIAL FOR THE PATIENT/FAMILY/CAREGIVERS. DOCUMENTING CLINICAL INFORMATION IN THE ELECTRONIC MEDICAL RECORD   4. REVIEWING OARRS AS NEEDED    MDM  1) COMPLEXITY: MORE THAN 1 STABLE CHRONIC CONDITION ADDRESSED OR 1 ACUTE ILLNESS ADDRESSED   2)DATA: TESTS  INTERPRETED AND OR ORDERED, TOOK INDEPENDENT HISTORY OR RECORDS REVIEWED  3)RISK: MODERATE RISK DUE TO NATURE OF MEDICAL CONDITIONS/COMORBIDITY OR MEDICATIONS ORDERED OR SURGICAL OR PROCEDURE REFERRAL    APRIL WITH FASTING LABS

## 2024-08-26 ENCOUNTER — APPOINTMENT (OUTPATIENT)
Dept: PRIMARY CARE | Facility: CLINIC | Age: 49
End: 2024-08-26
Payer: MEDICAID

## 2024-08-30 ENCOUNTER — HOSPITAL ENCOUNTER (OUTPATIENT)
Dept: RADIOLOGY | Facility: HOSPITAL | Age: 49
Discharge: HOME | End: 2024-08-30
Payer: MEDICAID

## 2024-08-30 DIAGNOSIS — M54.50 CHRONIC BILATERAL LOW BACK PAIN WITHOUT SCIATICA: ICD-10-CM

## 2024-08-30 DIAGNOSIS — G89.29 NECK PAIN, CHRONIC: ICD-10-CM

## 2024-08-30 DIAGNOSIS — M54.2 NECK PAIN, CHRONIC: ICD-10-CM

## 2024-08-30 DIAGNOSIS — M25.552 BILATERAL HIP PAIN: ICD-10-CM

## 2024-08-30 DIAGNOSIS — G89.29 CHRONIC BILATERAL LOW BACK PAIN WITHOUT SCIATICA: ICD-10-CM

## 2024-08-30 DIAGNOSIS — M25.551 BILATERAL HIP PAIN: ICD-10-CM

## 2024-08-30 PROCEDURE — 72114 X-RAY EXAM L-S SPINE BENDING: CPT

## 2024-08-30 PROCEDURE — 72072 X-RAY EXAM THORAC SPINE 3VWS: CPT

## 2024-08-30 PROCEDURE — 73522 X-RAY EXAM HIPS BI 3-4 VIEWS: CPT

## 2024-08-30 PROCEDURE — 72040 X-RAY EXAM NECK SPINE 2-3 VW: CPT

## 2024-09-06 ENCOUNTER — EVALUATION (OUTPATIENT)
Dept: PHYSICAL THERAPY | Facility: CLINIC | Age: 49
End: 2024-09-06
Payer: MEDICAID

## 2024-09-06 DIAGNOSIS — M54.50 CHRONIC BILATERAL LOW BACK PAIN WITHOUT SCIATICA: ICD-10-CM

## 2024-09-06 DIAGNOSIS — M54.2 NECK PAIN, CHRONIC: Primary | ICD-10-CM

## 2024-09-06 DIAGNOSIS — G89.29 CHRONIC BILATERAL LOW BACK PAIN WITHOUT SCIATICA: ICD-10-CM

## 2024-09-06 DIAGNOSIS — M25.551 BILATERAL HIP PAIN: ICD-10-CM

## 2024-09-06 DIAGNOSIS — M25.552 BILATERAL HIP PAIN: ICD-10-CM

## 2024-09-06 DIAGNOSIS — G89.29 NECK PAIN, CHRONIC: Primary | ICD-10-CM

## 2024-09-06 PROCEDURE — 97161 PT EVAL LOW COMPLEX 20 MIN: CPT | Mod: GP

## 2024-09-06 PROCEDURE — 97110 THERAPEUTIC EXERCISES: CPT | Mod: GP

## 2024-09-06 ASSESSMENT — PAIN - FUNCTIONAL ASSESSMENT: PAIN_FUNCTIONAL_ASSESSMENT: 0-10

## 2024-09-06 ASSESSMENT — ENCOUNTER SYMPTOMS
LOSS OF SENSATION IN FEET: 1
DEPRESSION: 0
OCCASIONAL FEELINGS OF UNSTEADINESS: 1

## 2024-09-06 ASSESSMENT — PAIN SCALES - GENERAL: PAINLEVEL_OUTOF10: 2

## 2024-09-06 NOTE — PROGRESS NOTES
Physical Therapy    Physical Therapy Cervical Spine Evaluation    Patient Name: Inge Vance  MRN: [id number]  Today's Date: 2024  Time Calculation  Start Time: 905  Stop Time: 955  Time Calculation (min): 50 min  PT Evaluation Time Entry  PT Evaluation (Low) Time Entry: 30  PT Therapeutic Procedures Time Entry  Therapeutic Exercise Time Entry: 18                   Current Problem  Problem List Items Addressed This Visit             ICD-10-CM    Neck pain, chronic M54.2, G89.29     Other Visit Diagnoses         Codes    Chronic bilateral low back pain without sciatica     M54.50, G89.29    Bilateral hip pain     M25.551, M25.552               General  Reason for Referral: Neck pain, chronic  Referred By: Evy Wood  General Comment: Visit #1  Precautions  Precautions  STEADI Fall Risk Score (The score of 4 or more indicates an increased risk of falling): 8  Precautions Comment: fall risk due to STEADI score     Ambulatory Screenings Summary       Screening  Frequency  Date Last Completed   Spiritual and Cultural Beliefs   Screening each visit or episode of care 2024   Falls Risk Screening every ambulatory visit [facility]   Pain Screening annually at primary care visit     Domestic Violence screening annually at primary care visit 2024   Depression Screening annually in the primary care setting 2024   Suicide Risk Screening annually in the primary care setting 2024   Nutrition and Food Insecurity   Screening at least annually at primary care visit     Key Learner annually in the primary care setting 2024   Drug Screen  2024  8:48 AM   Alcohol Screen  2024  8:48 AM   Advance Directive       Pain  Pain Assessment: 0-10  0-10 (Numeric) Pain Score: 2  Pain Type: Chronic pain  Pain Location: Neck  Pain Frequency: Constant/continuous    SUBJECTIVE:   Patient verified by name and . Patient reports it is not just neck pain but back and leg pain as well. Between the neck,  "back and hips she cannot sit for long periods of time. Unable to lay down for long periods of time at night, in constant chronic pain. States she is \"eating tylenol like candy\". Uses lidocaine roller at work on back of the neck. Feels as if she is always in some pain, never fully goes away. Pain has been going on since at least 2008.   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     Patients goal: less pain, be able to sit and walk without pain     Prior tx: massage therapy    OBJECTIVE:    Posture: forward head, rounded shoulders     Upper extremity ROM: WNL Unless documented below:  ROM in Degrees RIGHT LEFT   Shoulder Flexion     Shoulder Abduction     Shoulder ER     Shoulder IR     Elbow Flexion     Elbow Extension         Upper Extremity Strength:   RIGHT LEFT   Shoulder Flexion 4 4   Shoulder Abduction 4 4   Shoulder ER     Shoulder IR     Elbow Flexion     Elbow Extension       Cervical ROM:  Date: eval Percentage    Flexion 40    Extension 31     RIGHT LEFT   Side bend 35 35   Rotation 45 44     Lumbar ROM:  Date: eval Percentage    Flexion 50%    Extension 50%     RIGHT LEFT   Side Bend 75% 75%   Rotation 50% 50%     Lower Extremity Strength:  Date: eval Myotome RIGHT LEFT   Hip Flexion L1,2 4 4   Hip ext      Hip abd  4 4   Hip add  4 4   Knee Extension L3 4+ 4+   Knee Flexion  4+ 4+   Ankle DF L4 5 5   Great toe Ext L5       Palpation Hypersensitivity of mid-low back, TTP of B UT, B rhomboids, B paraspinals   Neurological symptoms-d enies any "   Special tests:  Balance   NBOS 20 s min sway  NBOS EC 20s mod sway  L tandem 9 sec   R tandem 12 s   SLR : (-) B  Flexibility:    Hamstrings: WNL B    Gastroc: mod restriction B   Outcome Measure     NDI: 25      TREATMENT:   - scapular squeezes seated 2x10 5s hold  - chin tucks 2x10 5s hold  - UT stretch bilat 3x30 sec   - levator scap stretch [id number]     OP EDUCATION: Patient performed all exercises with proper form during treatment session. Patient was educated on frequency and duration to perform exercises at home. Pt was also educated on office set up.     Access Code: [email or web address]  URL: [email or web address]  Date: 09/06/2024  Prepared by: Gina Vieyra    Exercises  - Seated Scapular Retraction  - 1 x daily - 7 x weekly - 3 sets - 10 reps  - Seated Passive Cervical Retraction  - 1 x daily - 7 x weekly - 3 sets - 10 reps  - Seated Gentle Upper Trapezius Stretch  - 1 x daily - 7 x weekly - 3 sets - 30s  hold  - Gentle Levator Scapulae Stretch  - 1 x daily - 7 x weekly - 3 sets - 30s hold      Patient Education  - Office Posture  ASSESSEMENT  Pt is a 49 y.o.  referred for physical therapy by vEy Wood APRN-CNP  for neck pain. Pt presents with impairments of   Pain, Pain with Activity, Decreased ROM, Decreased Strength, Increased Tissue tension, Increased Tissue tenderness, and Decreased Balance. Inability to sit, stand or walk for long periods of time without pain. Patient would benefit from a therapy program to restore prior level of function, decrease pain, increase AROM, increase strength and improve posture.    The physical therapy prognosis is good for the patient to achieve their goals.   The pt tolerated therapy treatment today well with no adverse effects.  Barriers to therapy/learning include:     PLAN: add/progress posture and strengthening exercises for cervical and thoracic spine. Work on balance and overall strength/endurance to improve safety and functional mobility.    PT Frequency: 1 time per week  Duration: 6 wks      The pt has been educated about the risks and benefits of physical therapy including manual therapy treatments and gives consent for treatment.     The patient will benefit from physical therapy treatment to include: Treatment/Interventions: Aquatic therapy, Biofeedback, Blood flow restriction therapy, Cryotherapy, Dry needling, Education/ Instruction, Electrical stimulation, Gait training, Hot pack, Manual therapy, Mechanical traction, Neuromuscular re-education, Self care/ home management, Therapeutic activities, Taping techniques, Therapeutic exercises, Ultrasound, Vasopneumatic device     Goals:  Active       Neck/back pain        Pt will report full compliance with HEP atleast 4 days a week in 2 weeks.        Start:  09/06/24    Expected End:  11/05/24            Pt will improve NDI score from a 25/50 to a 30/50 in 6weeks to improve overall quality of life.        Start:  09/06/24    Expected End:  11/05/24            Pt will report adjustments to desk setup by 2 weeks to improve posture.        Start:  09/06/24    Expected End:  11/05/24            Pt will improve lumbar flexion and B rotation ROM from 50% of full motion to 75% full motion in 6 weeks.        Start:  09/06/24    Expected End:  11/05/24            Pt will improve AROM of cervical spine into side bending by 5 degrees on each side to improve functional mobility in 6 weeks.        Start:  09/06/24    Expected End:  11/05/24

## 2024-09-20 ENCOUNTER — TREATMENT (OUTPATIENT)
Dept: PHYSICAL THERAPY | Facility: CLINIC | Age: 49
End: 2024-09-20
Payer: MEDICAID

## 2024-09-20 DIAGNOSIS — G89.29 CHRONIC BILATERAL LOW BACK PAIN WITHOUT SCIATICA: ICD-10-CM

## 2024-09-20 DIAGNOSIS — M54.2 NECK PAIN, CHRONIC: ICD-10-CM

## 2024-09-20 DIAGNOSIS — G89.29 NECK PAIN, CHRONIC: ICD-10-CM

## 2024-09-20 DIAGNOSIS — M25.552 BILATERAL HIP PAIN: ICD-10-CM

## 2024-09-20 DIAGNOSIS — M25.551 BILATERAL HIP PAIN: ICD-10-CM

## 2024-09-20 DIAGNOSIS — M54.50 CHRONIC BILATERAL LOW BACK PAIN WITHOUT SCIATICA: ICD-10-CM

## 2024-09-20 PROCEDURE — 97110 THERAPEUTIC EXERCISES: CPT | Mod: GP,CQ

## 2024-09-20 ASSESSMENT — PAIN SCALES - GENERAL: PAINLEVEL_OUTOF10: 2

## 2024-09-20 NOTE — PROGRESS NOTES
"Physical Therapy Treatment    Patient Name: Inge Vance  MRN: [id number]  Today's Date: 9/20/2024  Time Calculation  Start Time: 1215  Stop Time: 1300  Time Calculation (min): 45 min  PT Therapeutic Procedures Time Entry  Therapeutic Exercise Time Entry: 40         Current Problem  Problem List Items Addressed This Visit             ICD-10-CM    Neck pain, chronic M54.2, G89.29     Other Visit Diagnoses         Codes    Chronic bilateral low back pain without sciatica     M54.50, G89.29    Bilateral hip pain     M25.551, M25.552            Subjective   Pt.'s name and birthday confirmed.  Pt. Is compliant with HEP.  Pt. Has no c/o sx.'s with neck right now.  Pt. States she has low back pain 24/7 which pain is more intense in the evening.  Pt. States she does not have good balance.    Reason for Referral: Neck pain, chronic  Referred By: Evy Wood  General Comment: Visit #2      Precautions  Precautions  Precautions Comment: fall risk due to STEADI score      Pain  0-10 (Numeric) Pain Score: 2  Pain Location: Neck  Pain Orientation: Lower    Objective:  Treatments: 39 mins   - scapular squeezes seated 2x10 5s hold  - chin tucks 2x10 5s hold  - UT stretch bilat 3x30 sec   - levator scap stretch [id number]    Supine:    -hugs x10 (N)    -scissors x10 (N)    -snow angels x10 (N)  Sidelying: Openbook stretch x5  10-15\" ea (N)  -seated thoracic stretch in chair x5  5\" hold (N)  -seated hip adduction x10 (N)  -seated hip abduction, green band  x10 (N)    Manual:  Scapular-thoracic x5 mins        add/progress posture and strengthening exercises for cervical and thoracic spine. Work on balance and overall strength/endurance to improve safety and functional mobility.           OP EDUCATION:  Access Code: [email or web address]  URL: [email or web address]  Date: 09/20/2024  Prepared by: Jaida Yo    Exercises  - Seated Thoracic Lumbar Extension  - 1 x daily - 7 x weekly - 1 sets - 10 reps  - Sidelying Open Book " Thoracic Rotation with Knee on Foam Roll  - 1 x daily - 7 x weekly - 1 sets - 5 reps - 5 hold  - Thoracic Foam Roll Mobilization Hug  - 1 x daily - 7 x weekly - 1 sets - 10 reps  - Thoracic Y on Foam Roll  - 1 x daily - 7 x weekly - 1 sets - 10 reps  - Supine on Foam Roll Reach and Roll  - 1 x daily - 7 x weekly - 1 sets - 10 reps  - Seated Hip Abduction with Resistance  - 1 x daily - 7 x weekly - 2 sets - 10 reps  - Seated Hip Adduction Isometrics with Ball  - 1 x daily - 7 x weekly - 2 sets - 10 reps     Patient performed all exercises with proper form during treatment session. Patient was educated on frequency and duration to perform exercises at home. Pt was also educated on office set up.      Access Code: [email or web address]  URL: [email or web address]  Date: 09/06/2024  Prepared by: Gina Vieyra     Exercises  - Seated Scapular Retraction  - 1 x daily - 7 x weekly - 3 sets - 10 reps  - Seated Passive Cervical Retraction  - 1 x daily - 7 x weekly - 3 sets - 10 reps  - Seated Gentle Upper Trapezius Stretch  - 1 x daily - 7 x weekly - 3 sets - 30s  hold  - Gentle Levator Scapulae Stretch  - 1 x daily - 7 x weekly - 3 sets - 30s hold        Patient Education  - Office Posture           Assessment:  Trial thoracic;/lower neck stretches which patient is very tight in named areas.  Verbal cues needed with form.  Added hip strengthening as well with no c/o issues.  Handout provided and reviewed with patient.       Plan:  Continue with strengthening, ROM and flexibility per tolerance to improve daily activities/work duties with little to no difficulty.  MB       OP PT Plan  Treatment/Interventions: Aquatic therapy, Biofeedback, Blood flow restriction therapy, Cryotherapy, Dry needling, Education/ Instruction, Electrical stimulation, Gait training, Hot pack, Manual therapy, Mechanical traction, Neuromuscular re-education, Self care/ home management, Therapeutic activities, Taping techniques, Therapeutic  exercises, Ultrasound, Vasopneumatic device  PT Plan: Skilled PT  PT Frequency: 1 time per week  Duration: 6 wks  Rehab Potential: Good  Plan of Care Agreement: Patient              Goals:  Active       Neck/back pain        Pt will report full compliance with HEP atleast 4 days a week in 2 weeks.        Start:  09/06/24    Expected End:  11/05/24            Pt will improve NDI score from a 25/50 to a 30/50 in 6weeks to improve overall quality of life.        Start:  09/06/24    Expected End:  11/05/24            Pt will report adjustments to desk setup by 2 weeks to improve posture.        Start:  09/06/24    Expected End:  11/05/24            Pt will improve lumbar flexion and B rotation ROM from 50% of full motion to 75% full motion in 6 weeks.        Start:  09/06/24    Expected End:  11/05/24            Pt will improve AROM of cervical spine into side bending by 5 degrees on each side to improve functional mobility in 6 weeks.        Start:  09/06/24    Expected End:  11/05/24

## 2024-09-27 ENCOUNTER — APPOINTMENT (OUTPATIENT)
Dept: PHYSICAL THERAPY | Facility: CLINIC | Age: 49
End: 2024-09-27
Payer: MEDICAID

## 2024-10-04 ENCOUNTER — APPOINTMENT (OUTPATIENT)
Dept: PHYSICAL THERAPY | Facility: CLINIC | Age: 49
End: 2024-10-04
Payer: MEDICAID

## 2024-10-11 ENCOUNTER — APPOINTMENT (OUTPATIENT)
Dept: PHYSICAL THERAPY | Facility: CLINIC | Age: 49
End: 2024-10-11
Payer: MEDICAID

## 2024-10-21 ENCOUNTER — OFFICE VISIT (OUTPATIENT)
Dept: PRIMARY CARE | Facility: CLINIC | Age: 49
End: 2024-10-21
Payer: MEDICAID

## 2024-10-21 ENCOUNTER — TELEPHONE (OUTPATIENT)
Dept: PRIMARY CARE | Facility: CLINIC | Age: 49
End: 2024-10-21

## 2024-10-21 VITALS
BODY MASS INDEX: 36.99 KG/M2 | DIASTOLIC BLOOD PRESSURE: 80 MMHG | WEIGHT: 222 LBS | TEMPERATURE: 98.4 F | HEART RATE: 80 BPM | HEIGHT: 65 IN | SYSTOLIC BLOOD PRESSURE: 130 MMHG

## 2024-10-21 DIAGNOSIS — R10.9 FLANK PAIN: Primary | ICD-10-CM

## 2024-10-21 DIAGNOSIS — N30.91 CYSTITIS WITH HEMATURIA: ICD-10-CM

## 2024-10-21 DIAGNOSIS — E11.59 HYPERTENSION ASSOCIATED WITH DIABETES (MULTI): ICD-10-CM

## 2024-10-21 DIAGNOSIS — I15.2 HYPERTENSION ASSOCIATED WITH DIABETES (MULTI): ICD-10-CM

## 2024-10-21 DIAGNOSIS — Z87.442 HISTORY OF KIDNEY STONES: ICD-10-CM

## 2024-10-21 LAB
POC APPEARANCE, URINE: ABNORMAL
POC BILIRUBIN, URINE: ABNORMAL
POC BLOOD, URINE: ABNORMAL
POC COLOR, URINE: ABNORMAL
POC GLUCOSE, URINE: NEGATIVE MG/DL
POC KETONES, URINE: ABNORMAL MG/DL
POC LEUKOCYTES, URINE: ABNORMAL
POC NITRITE,URINE: NEGATIVE
POC PH, URINE: 6 PH
POC PROTEIN, URINE: ABNORMAL MG/DL
POC SPECIFIC GRAVITY, URINE: 1.02
POC UROBILINOGEN, URINE: 4 EU/DL

## 2024-10-21 PROCEDURE — 99214 OFFICE O/P EST MOD 30 MIN: CPT | Performed by: INTERNAL MEDICINE

## 2024-10-21 PROCEDURE — 3075F SYST BP GE 130 - 139MM HG: CPT | Performed by: INTERNAL MEDICINE

## 2024-10-21 PROCEDURE — 3050F LDL-C >= 130 MG/DL: CPT | Performed by: INTERNAL MEDICINE

## 2024-10-21 PROCEDURE — 3060F POS MICROALBUMINURIA REV: CPT | Performed by: INTERNAL MEDICINE

## 2024-10-21 PROCEDURE — 81003 URINALYSIS AUTO W/O SCOPE: CPT | Performed by: INTERNAL MEDICINE

## 2024-10-21 PROCEDURE — 3079F DIAST BP 80-89 MM HG: CPT | Performed by: INTERNAL MEDICINE

## 2024-10-21 PROCEDURE — 3008F BODY MASS INDEX DOCD: CPT | Performed by: INTERNAL MEDICINE

## 2024-10-21 PROCEDURE — 3044F HG A1C LEVEL LT 7.0%: CPT | Performed by: INTERNAL MEDICINE

## 2024-10-21 PROCEDURE — 1036F TOBACCO NON-USER: CPT | Performed by: INTERNAL MEDICINE

## 2024-10-21 PROCEDURE — 87086 URINE CULTURE/COLONY COUNT: CPT

## 2024-10-21 RX ORDER — OXYCODONE HYDROCHLORIDE 5 MG/1
5 TABLET ORAL EVERY 4 HOURS PRN
COMMUNITY
Start: 2024-10-18

## 2024-10-21 RX ORDER — ONDANSETRON 4 MG/1
4 TABLET, ORALLY DISINTEGRATING ORAL EVERY 8 HOURS PRN
COMMUNITY
Start: 2024-10-13

## 2024-10-21 RX ORDER — CIPROFLOXACIN 250 MG/1
250 TABLET, FILM COATED ORAL 2 TIMES DAILY
Qty: 14 TABLET | Refills: 0 | Status: SHIPPED | OUTPATIENT
Start: 2024-10-21 | End: 2024-10-28

## 2024-10-21 ASSESSMENT — ENCOUNTER SYMPTOMS
ABDOMINAL PAIN: 0
GASTROINTESTINAL NEGATIVE: 1
BACK PAIN: 0
AGITATION: 0
COUGH: 0
VOMITING: 0
DIARRHEA: 0
LIGHT-HEADEDNESS: 0
CONSTIPATION: 0
DIZZINESS: 0
FREQUENCY: 1
FLANK PAIN: 1
EYES NEGATIVE: 1
DYSURIA: 0
ABDOMINAL DISTENTION: 0
CARDIOVASCULAR NEGATIVE: 1
RHINORRHEA: 0
CHILLS: 0
SHORTNESS OF BREATH: 0
HEADACHES: 0
WEAKNESS: 0
PALPITATIONS: 0
PSYCHIATRIC NEGATIVE: 1
NEUROLOGICAL NEGATIVE: 1
FEVER: 0
ENDOCRINE NEGATIVE: 1
NAUSEA: 0
WHEEZING: 0

## 2024-10-21 NOTE — TELEPHONE ENCOUNTER
PLEASE CALL [facility]. PT HAD CT OF ABDOMEN DONE ON 10/18 (TWICE) .BOTH REPORTS SAY GALLBLADDER IS UNREMARKABLE , BUT PT HAD GALLBLADDER REMOVAL IN THE PAST. ASK IF RADIOLOGIST CAN READ IT AGAIN .

## 2024-10-21 NOTE — PROGRESS NOTES
Subjective   Patient ID: Inge Vance is a 49 y.o. female who presents for ER Follow-up (F/U ER VISIT 10/1824 - ABDOMINAL/BACK PAIN S/P BARIATRIC SURGERY 10/14/24. CONTINUES TO HAVE BACK PAIN WHICH SHE CLAIMS STARTED PRIOR TO THE SURGERY. APPOINTMENT SCHEDULED WITH SURGEON TOMORROW TO F/U. ).  HPI  F/U AFTER THE ER VISIT FOR B/L FLANK PAIN . STILL HAS IT 7/10 WITH URINE FREQUENCY X 1-2 WEEKS (PAIN WAS PRESENT BEFORE RECENT BARIATRIC SURGERY). OTC TX DIDN'T HELP.HAD LABS AND CT OF ABDOMEN DONE AT ER.  Review of Systems   Constitutional:  Negative for chills and fever.   HENT: Negative.  Negative for congestion, postnasal drip and rhinorrhea.    Eyes: Negative.  Negative for visual disturbance.   Respiratory:  Negative for cough, shortness of breath and wheezing.    Cardiovascular: Negative.  Negative for chest pain, palpitations and leg swelling.   Gastrointestinal: Negative.  Negative for abdominal distention, abdominal pain, constipation, diarrhea, nausea and vomiting.   Endocrine: Negative.    Genitourinary:  Positive for flank pain and frequency. Negative for dysuria and urgency.   Musculoskeletal:  Negative for back pain.   Skin: Negative.  Negative for rash.   Allergic/Immunologic: Negative for immunocompromised state.   Neurological: Negative.  Negative for dizziness, weakness, light-headedness and headaches.   Psychiatric/Behavioral: Negative.  Negative for agitation.        Objective   Physical Exam  Constitutional:       General: She is not in acute distress.  HENT:      Head: Normocephalic.      Nose: Nose normal.      Mouth/Throat:      Mouth: Mucous membranes are moist.   Eyes:      Conjunctiva/sclera: Conjunctivae normal.      Pupils: Pupils are equal, round, and reactive to light.   Cardiovascular:      Rate and Rhythm: Normal rate and regular rhythm.      Pulses: Normal pulses.      Heart sounds: Normal heart sounds.   Pulmonary:      Effort: No respiratory distress.      Breath sounds: No wheezing.    Chest:      Chest wall: No tenderness.   Abdominal:      General: Abdomen is flat. Bowel sounds are normal.      Palpations: Abdomen is soft.      Tenderness: There is no abdominal tenderness.      Comments: B/L FLANK TENDERNESS.   Musculoskeletal:         General: No tenderness. Normal range of motion.      Cervical back: Normal range of motion.   Lymphadenopathy:      Cervical: No cervical adenopathy.   Skin:     General: Skin is warm and dry.      Findings: No rash.   Neurological:      General: No focal deficit present.      Mental Status: She is alert. Mental status is at baseline.   Psychiatric:         Mood and Affect: Mood normal.         Behavior: Behavior normal.         Assessment/Plan   1. Flank pain  POCT UA Automated manually resulted    Urine Culture    Urine Culture    US renal complete      2. History of kidney stones  US renal complete      3. Cystitis with hematuria  US renal complete    ciprofloxacin (Cipro) 250 mg tablet      4. Hypertension associated with diabetes (Multi)          TEST RESULTS WERE DISCUSSED.  ADVISED TO HAVE LOW FAT AND LOW CALORIE DIET AND TO LOOSE WEIGHT, DAILY EXERCISE.  Advised to avoid holding the urine and to EMPTY THE BLADDER FREQUENTLY.  ADVISED TO CUT DOWN CAFFEINE, TO DRINK MORE WATER .  ADVISED TO GO TO ER IF FLANK PAIN GETS WORSE.     MDM    1) COMPLEXITY: 1 UNDIAGNOSED NEW PROBLEM WITH UNCERTAIN PROGNOSIS  2)DATA: TESTS INTERPRETED AND OR ORDERED, TOOK INDEPENDENT HISTORY OR RECORDS REVIEWED  3)RISK: MODERATE RISK DUE TO NATURE OF MEDICAL CONDITIONS/COMORBIDITY OR MEDICATIONS ORDERED OR SURGICAL OR PROCEDURE REFERRAL, .     2 WEEKS

## 2024-10-22 ENCOUNTER — HOSPITAL ENCOUNTER (OUTPATIENT)
Dept: RADIOLOGY | Facility: HOSPITAL | Age: 49
Discharge: HOME | End: 2024-10-22
Payer: MEDICAID

## 2024-10-22 DIAGNOSIS — Z87.442 HISTORY OF KIDNEY STONES: ICD-10-CM

## 2024-10-22 DIAGNOSIS — R10.9 FLANK PAIN: ICD-10-CM

## 2024-10-22 DIAGNOSIS — N30.91 CYSTITIS WITH HEMATURIA: ICD-10-CM

## 2024-10-22 PROCEDURE — 76770 US EXAM ABDO BACK WALL COMP: CPT

## 2024-10-22 PROCEDURE — 76770 US EXAM ABDO BACK WALL COMP: CPT | Performed by: RADIOLOGY

## 2024-10-22 NOTE — TELEPHONE ENCOUNTER
I CALLED [facility] AND SPOKE WITH THE RADIOLOGY DEPARTMENT. THE  SAID THAT WE WOULD NEED TO REACH OUT THEIR [place] [facility] GROUP TO DISCUSS HAVING CT REREAD.   [phone]

## 2024-10-23 LAB — BACTERIA UR CULT: NO GROWTH

## 2024-10-28 ENCOUNTER — TELEPHONE (OUTPATIENT)
Dept: PRIMARY CARE | Facility: CLINIC | Age: 49
End: 2024-10-28
Payer: MEDICAID

## 2024-10-28 DIAGNOSIS — I15.2 HYPERTENSION ASSOCIATED WITH DIABETES (MULTI): ICD-10-CM

## 2024-10-28 DIAGNOSIS — E11.9 TYPE 2 DIABETES MELLITUS WITHOUT COMPLICATION, WITHOUT LONG-TERM CURRENT USE OF INSULIN (MULTI): Primary | ICD-10-CM

## 2024-10-28 DIAGNOSIS — E11.59 HYPERTENSION ASSOCIATED WITH DIABETES (MULTI): ICD-10-CM

## 2024-10-28 DIAGNOSIS — E55.9 VITAMIN D DEFICIENCY: ICD-10-CM

## 2024-10-28 DIAGNOSIS — R53.83 FATIGUE, UNSPECIFIED TYPE: ICD-10-CM

## 2024-10-29 ENCOUNTER — LAB (OUTPATIENT)
Dept: LAB | Facility: LAB | Age: 49
End: 2024-10-29
Payer: MEDICAID

## 2024-10-29 DIAGNOSIS — E55.9 VITAMIN D DEFICIENCY: ICD-10-CM

## 2024-10-29 DIAGNOSIS — R53.83 FATIGUE, UNSPECIFIED TYPE: ICD-10-CM

## 2024-10-29 DIAGNOSIS — E11.59 HYPERTENSION ASSOCIATED WITH DIABETES (MULTI): ICD-10-CM

## 2024-10-29 DIAGNOSIS — I15.2 HYPERTENSION ASSOCIATED WITH DIABETES (MULTI): ICD-10-CM

## 2024-10-29 DIAGNOSIS — E11.9 TYPE 2 DIABETES MELLITUS WITHOUT COMPLICATION, WITHOUT LONG-TERM CURRENT USE OF INSULIN (MULTI): ICD-10-CM

## 2024-10-29 LAB
25(OH)D3 SERPL-MCNC: 29 NG/ML (ref 30–100)
ALBUMIN SERPL BCP-MCNC: 4.6 G/DL (ref 3.4–5)
ALP SERPL-CCNC: 75 U/L (ref 33–110)
ALT SERPL W P-5'-P-CCNC: 25 U/L (ref 7–45)
ANION GAP SERPL CALC-SCNC: 11 MMOL/L (ref 10–20)
AST SERPL W P-5'-P-CCNC: 19 U/L (ref 9–39)
BASOPHILS # BLD AUTO: 0.1 X10*3/UL (ref 0–0.1)
BASOPHILS NFR BLD AUTO: 1.4 %
BILIRUB SERPL-MCNC: 0.5 MG/DL (ref 0–1.2)
BUN SERPL-MCNC: 14 MG/DL (ref 6–23)
CALCIUM SERPL-MCNC: 9.8 MG/DL (ref 8.6–10.3)
CHLORIDE SERPL-SCNC: 110 MMOL/L (ref 98–107)
CO2 SERPL-SCNC: 27 MMOL/L (ref 21–32)
CREAT SERPL-MCNC: 0.78 MG/DL (ref 0.5–1.05)
EGFRCR SERPLBLD CKD-EPI 2021: >90 ML/MIN/1.73M*2
EOSINOPHIL # BLD AUTO: 0.56 X10*3/UL (ref 0–0.7)
EOSINOPHIL NFR BLD AUTO: 7.8 %
ERYTHROCYTE [DISTWIDTH] IN BLOOD BY AUTOMATED COUNT: 12 % (ref 11.5–14.5)
EST. AVERAGE GLUCOSE BLD GHB EST-MCNC: 94 MG/DL
GLUCOSE SERPL-MCNC: 90 MG/DL (ref 74–99)
HBA1C MFR BLD: 4.9 %
HCT VFR BLD AUTO: 42.9 % (ref 36–46)
HGB BLD-MCNC: 13.9 G/DL (ref 12–16)
IMM GRANULOCYTES # BLD AUTO: 0.02 X10*3/UL (ref 0–0.7)
IMM GRANULOCYTES NFR BLD AUTO: 0.3 % (ref 0–0.9)
LYMPHOCYTES # BLD AUTO: 2.17 X10*3/UL (ref 1.2–4.8)
LYMPHOCYTES NFR BLD AUTO: 30.3 %
MCH RBC QN AUTO: 31.1 PG (ref 26–34)
MCHC RBC AUTO-ENTMCNC: 32.4 G/DL (ref 32–36)
MCV RBC AUTO: 96 FL (ref 80–100)
MONOCYTES # BLD AUTO: 0.4 X10*3/UL (ref 0.1–1)
MONOCYTES NFR BLD AUTO: 5.6 %
NEUTROPHILS # BLD AUTO: 3.92 X10*3/UL (ref 1.2–7.7)
NEUTROPHILS NFR BLD AUTO: 54.6 %
NRBC BLD-RTO: 0 /100 WBCS (ref 0–0)
PLATELET # BLD AUTO: 430 X10*3/UL (ref 150–450)
POTASSIUM SERPL-SCNC: 4.4 MMOL/L (ref 3.5–5.3)
PROT SERPL-MCNC: 6.6 G/DL (ref 6.4–8.2)
RBC # BLD AUTO: 4.47 X10*6/UL (ref 4–5.2)
SODIUM SERPL-SCNC: 144 MMOL/L (ref 136–145)
WBC # BLD AUTO: 7.2 X10*3/UL (ref 4.4–11.3)

## 2024-10-29 PROCEDURE — 80053 COMPREHEN METABOLIC PANEL: CPT

## 2024-10-29 PROCEDURE — 82306 VITAMIN D 25 HYDROXY: CPT

## 2024-10-29 PROCEDURE — 85025 COMPLETE CBC W/AUTO DIFF WBC: CPT

## 2024-10-29 PROCEDURE — 36415 COLL VENOUS BLD VENIPUNCTURE: CPT

## 2024-10-29 PROCEDURE — 83036 HEMOGLOBIN GLYCOSYLATED A1C: CPT

## 2024-11-04 ENCOUNTER — APPOINTMENT (OUTPATIENT)
Dept: PRIMARY CARE | Facility: CLINIC | Age: 49
End: 2024-11-04
Payer: MEDICAID

## 2024-11-04 VITALS
WEIGHT: 216 LBS | BODY MASS INDEX: 35.99 KG/M2 | DIASTOLIC BLOOD PRESSURE: 71 MMHG | HEIGHT: 65 IN | SYSTOLIC BLOOD PRESSURE: 103 MMHG | HEART RATE: 71 BPM

## 2024-11-04 DIAGNOSIS — R51.9 CHRONIC NONINTRACTABLE HEADACHE, UNSPECIFIED HEADACHE TYPE: ICD-10-CM

## 2024-11-04 DIAGNOSIS — G89.29 ACUTE ON CHRONIC LOW BACK PAIN: ICD-10-CM

## 2024-11-04 DIAGNOSIS — I15.2 HYPERTENSION ASSOCIATED WITH DIABETES (MULTI): ICD-10-CM

## 2024-11-04 DIAGNOSIS — G89.29 CHRONIC NONINTRACTABLE HEADACHE, UNSPECIFIED HEADACHE TYPE: ICD-10-CM

## 2024-11-04 DIAGNOSIS — E11.59 HYPERTENSION ASSOCIATED WITH DIABETES (MULTI): ICD-10-CM

## 2024-11-04 DIAGNOSIS — E11.9 TYPE 2 DIABETES MELLITUS WITHOUT COMPLICATION, WITHOUT LONG-TERM CURRENT USE OF INSULIN (MULTI): ICD-10-CM

## 2024-11-04 DIAGNOSIS — M54.50 ACUTE ON CHRONIC LOW BACK PAIN: Primary | ICD-10-CM

## 2024-11-04 DIAGNOSIS — M54.50 ACUTE ON CHRONIC LOW BACK PAIN: ICD-10-CM

## 2024-11-04 DIAGNOSIS — M47.816 SPONDYLOSIS OF LUMBAR REGION WITHOUT MYELOPATHY OR RADICULOPATHY: Primary | ICD-10-CM

## 2024-11-04 DIAGNOSIS — G89.29 ACUTE ON CHRONIC LOW BACK PAIN: Primary | ICD-10-CM

## 2024-11-04 DIAGNOSIS — Z98.0 HISTORY OF BYPASS GASTROENTEROSTOMY: ICD-10-CM

## 2024-11-04 DIAGNOSIS — M47.816 SPONDYLOSIS OF LUMBAR REGION WITHOUT MYELOPATHY OR RADICULOPATHY: ICD-10-CM

## 2024-11-04 PROCEDURE — 1036F TOBACCO NON-USER: CPT | Performed by: INTERNAL MEDICINE

## 2024-11-04 PROCEDURE — 3044F HG A1C LEVEL LT 7.0%: CPT | Performed by: INTERNAL MEDICINE

## 2024-11-04 PROCEDURE — 3078F DIAST BP <80 MM HG: CPT | Performed by: INTERNAL MEDICINE

## 2024-11-04 PROCEDURE — 99214 OFFICE O/P EST MOD 30 MIN: CPT | Performed by: INTERNAL MEDICINE

## 2024-11-04 PROCEDURE — 3008F BODY MASS INDEX DOCD: CPT | Performed by: INTERNAL MEDICINE

## 2024-11-04 PROCEDURE — 3074F SYST BP LT 130 MM HG: CPT | Performed by: INTERNAL MEDICINE

## 2024-11-04 PROCEDURE — 3050F LDL-C >= 130 MG/DL: CPT | Performed by: INTERNAL MEDICINE

## 2024-11-04 PROCEDURE — 3060F POS MICROALBUMINURIA REV: CPT | Performed by: INTERNAL MEDICINE

## 2024-11-04 RX ORDER — TOPIRAMATE 100 MG/1
100 TABLET, FILM COATED ORAL 2 TIMES DAILY
Qty: 180 TABLET | Refills: 3 | Status: SHIPPED | OUTPATIENT
Start: 2024-11-04

## 2024-11-04 RX ORDER — CYCLOBENZAPRINE HCL 10 MG
10 TABLET ORAL NIGHTLY PRN
Qty: 10 TABLET | Refills: 0 | Status: SHIPPED | OUTPATIENT
Start: 2024-11-04 | End: 2024-11-08

## 2024-11-04 RX ORDER — LIDOCAINE AND MENTHOL 40; 40 MG/1; MG/1
1 PATCH TOPICAL DAILY
Qty: 30 PATCH | Refills: 1 | Status: SHIPPED | OUTPATIENT
Start: 2024-11-04

## 2024-11-04 ASSESSMENT — ENCOUNTER SYMPTOMS
EYES NEGATIVE: 1
CONSTIPATION: 0
PSYCHIATRIC NEGATIVE: 1
DIZZINESS: 0
FEVER: 0
CARDIOVASCULAR NEGATIVE: 1
LIGHT-HEADEDNESS: 0
VOMITING: 0
AGITATION: 0
WHEEZING: 0
NAUSEA: 0
RHINORRHEA: 0
DYSURIA: 0
CHILLS: 0
ABDOMINAL PAIN: 0
DIARRHEA: 0
BACK PAIN: 1
GASTROINTESTINAL NEGATIVE: 1
WEAKNESS: 0
SHORTNESS OF BREATH: 0
ABDOMINAL DISTENTION: 0
COUGH: 0
ENDOCRINE NEGATIVE: 1
PALPITATIONS: 0
HEADACHES: 0
NEUROLOGICAL NEGATIVE: 1

## 2024-11-04 ASSESSMENT — PATIENT HEALTH QUESTIONNAIRE - PHQ9
SUM OF ALL RESPONSES TO PHQ9 QUESTIONS 1 AND 2: 0
1. LITTLE INTEREST OR PLEASURE IN DOING THINGS: NOT AT ALL
2. FEELING DOWN, DEPRESSED OR HOPELESS: NOT AT ALL

## 2024-11-04 NOTE — PROGRESS NOTES
Subjective   Patient ID: Inge Vance is a 49 y.o. female who presents for Follow-up (2 wk follow up labs and renal US; c/o b/L flank pain remains; had xrays done on8/23/24 and no one has given her the results. ).  HPI  LAB AND RENAL U/S, MED REFILL . HAS R SIDED LOW BACK PAIN ON AND OFF 4-8/10. PAIN HAS BEEN WORSE SINCE RECENT GASTRIC BYPASS SURGERY. HAD WEIGHT LOSS BY BYPASS. FINISHED THE COURSE OF ANTIBIOTIC FOR CYSTITIS WHICH DIDN'T HELP FOR PAIN. DENIES HAVING URINE COMPLAINTS.  Review of Systems   Constitutional:  Negative for chills and fever.   HENT: Negative.  Negative for congestion, postnasal drip and rhinorrhea.    Eyes: Negative.  Negative for visual disturbance.   Respiratory:  Negative for cough, shortness of breath and wheezing.    Cardiovascular: Negative.  Negative for chest pain, palpitations and leg swelling.   Gastrointestinal: Negative.  Negative for abdominal distention, abdominal pain, constipation, diarrhea, nausea and vomiting.   Endocrine: Negative.    Genitourinary:  Negative for dysuria and urgency.   Musculoskeletal:  Positive for back pain.   Skin: Negative.  Negative for rash.   Allergic/Immunologic: Negative for immunocompromised state.   Neurological: Negative.  Negative for dizziness, weakness, light-headedness and headaches.   Psychiatric/Behavioral: Negative.  Negative for agitation.        Objective   Physical Exam  Constitutional:       General: She is not in acute distress.  HENT:      Head: Normocephalic.      Nose: Nose normal.      Mouth/Throat:      Mouth: Mucous membranes are moist.   Eyes:      Conjunctiva/sclera: Conjunctivae normal.      Pupils: Pupils are equal, round, and reactive to light.   Cardiovascular:      Rate and Rhythm: Normal rate and regular rhythm.      Pulses: Normal pulses.      Heart sounds: Normal heart sounds.   Pulmonary:      Effort: No respiratory distress.      Breath sounds: No wheezing.   Chest:      Chest wall: No tenderness.   Abdominal:       General: Abdomen is flat. Bowel sounds are normal.      Palpations: Abdomen is soft.      Tenderness: There is no abdominal tenderness.   Musculoskeletal:         General: Tenderness present. Normal range of motion.      Cervical back: Normal range of motion.      Comments: POINT TENDERNESS OF R LUMBAR ,CLOSE TO L3. NO CHANGE IN THE COLOR OF SKIN.   Lymphadenopathy:      Cervical: No cervical adenopathy.   Skin:     General: Skin is warm and dry.      Findings: No rash.   Neurological:      General: No focal deficit present.      Mental Status: She is alert. Mental status is at baseline.   Psychiatric:         Mood and Affect: Mood normal.         Behavior: Behavior normal.         Assessment/Plan   1. Acute on chronic low back pain  cyclobenzaprine (Flexeril) 10 mg tablet    Referral to Pain Medicine    lidocaine-menthol 4-4 % adhesive patch,medicated      2. Chronic nonintractable headache, unspecified headache type  topiramate (Topamax) 100 mg tablet      3. Spondylosis of lumbar region without myelopathy or radiculopathy  Referral to Pain Medicine    lidocaine-menthol 4-4 % adhesive patch,medicated      4. Hypertension associated with diabetes (Multi)        5. Type 2 diabetes mellitus without complication, without long-term current use of insulin (Multi)        6. History of bypass gastroenterostomy        TEST RESULTS WERE DISCUSSED.  ADVISED TO APPLY WARM COMPRESSION AND OTC PAIN CREAM PRN FOR PAIN. EXPLAINED THE PAIN CAN BE SECONDARY TO MUSCLE SPASM AND/OR NERVE IRRITATION.  HTN addressed as follow:    MONITOR BP   GOAL BP LOWER THAN 130/80  LOW SALT  EXERCISE DAILY  Diabetes Mellitus addressed as follow:    [address]  HGA1C GOAL LESS THAN 7  LOSE WT  EXERCISE DAILY  ADVISED TO HAVE LOW FAT AND LOW CALORIE DIET AND TO LOOSE WEIGHT, DAILY EXERCISE.      MDM    1) COMPLEXITY: 1 UNDIAGNOSED NEW PROBLEM WITH UNCERTAIN PROGNOSIS  2)DATA: TESTS INTERPRETED AND OR ORDERED, TOOK INDEPENDENT HISTORY OR  RECORDS REVIEWED  3)RISK: MODERATE RISK DUE TO NATURE OF MEDICAL CONDITIONS/COMORBIDITY OR MEDICATIONS ORDERED OR SURGICAL OR PROCEDURE REFERRAL, .       Has F/U.

## 2024-11-04 NOTE — TELEPHONE ENCOUNTER
lidocaine-menthol 4-4 % adhesive patch,medicated     [place] CARD(BIN#[id number] PCN:[id number]) - 70: NDC not covered. The drug you are attempting to dispense is not covered as submitted. Review all processor messages that were returned with the rejection. 11   I spoke w/ Scott a pharmacist w/ [facility] and he said insurance won't cover this, but they will cover the lidocaine 5%. Ok to change?

## 2024-11-05 RX ORDER — LIDOCAINE 50 MG/G
1 PATCH TOPICAL DAILY
COMMUNITY
End: 2024-11-05 | Stop reason: SDUPTHER

## 2024-11-05 RX ORDER — LIDOCAINE 50 MG/G
PATCH TOPICAL
Qty: 30 PATCH | Refills: 1 | Status: SHIPPED | OUTPATIENT
Start: 2024-11-05

## 2024-11-05 NOTE — TELEPHONE ENCOUNTER
nilsa Shah MD  You13 hours ago (6:39 PM)       YES,PLEASE CHANNGE AND SEND ME THE PROPOSAL AND INFORM THE PT.     You routed conversation to Yesenia Shah MD18 hours ago (2:38 PM)

## 2024-11-08 DIAGNOSIS — M54.50 ACUTE ON CHRONIC LOW BACK PAIN: ICD-10-CM

## 2024-11-08 DIAGNOSIS — G89.29 ACUTE ON CHRONIC LOW BACK PAIN: ICD-10-CM

## 2024-11-08 RX ORDER — CYCLOBENZAPRINE HCL 10 MG
TABLET ORAL
Qty: 10 TABLET | Refills: 0 | Status: SHIPPED | OUTPATIENT
Start: 2024-11-08

## 2024-11-15 ENCOUNTER — DOCUMENTATION (OUTPATIENT)
Dept: PHYSICAL THERAPY | Facility: CLINIC | Age: 49
End: 2024-11-15
Payer: MEDICAID

## 2025-01-03 ENCOUNTER — APPOINTMENT (OUTPATIENT)
Dept: PRIMARY CARE | Facility: CLINIC | Age: 50
End: 2025-01-03
Payer: MEDICAID

## 2025-03-07 ENCOUNTER — APPOINTMENT (OUTPATIENT)
Dept: PRIMARY CARE | Facility: CLINIC | Age: 50
End: 2025-03-07
Payer: MEDICAID

## 2025-03-07 ENCOUNTER — TELEPHONE (OUTPATIENT)
Dept: PRIMARY CARE | Facility: CLINIC | Age: 50
End: 2025-03-07

## 2025-03-07 VITALS
WEIGHT: 168.8 LBS | SYSTOLIC BLOOD PRESSURE: 118 MMHG | HEART RATE: 75 BPM | DIASTOLIC BLOOD PRESSURE: 84 MMHG | BODY MASS INDEX: 28.12 KG/M2 | HEIGHT: 65 IN

## 2025-03-07 DIAGNOSIS — J45.909 UNCOMPLICATED ASTHMA, UNSPECIFIED ASTHMA SEVERITY, UNSPECIFIED WHETHER PERSISTENT (HHS-HCC): ICD-10-CM

## 2025-03-07 DIAGNOSIS — R22.9 SKIN LUMPS: Primary | ICD-10-CM

## 2025-03-07 DIAGNOSIS — J30.2 SEASONAL ALLERGIES: ICD-10-CM

## 2025-03-07 DIAGNOSIS — I27.20 PULMONARY HYPERTENSION (MULTI): ICD-10-CM

## 2025-03-07 DIAGNOSIS — R91.8 LUNG NODULES: ICD-10-CM

## 2025-03-07 DIAGNOSIS — E11.59 HYPERTENSION ASSOCIATED WITH DIABETES (MULTI): ICD-10-CM

## 2025-03-07 DIAGNOSIS — J45.30 MILD PERSISTENT ASTHMA, UNSPECIFIED WHETHER COMPLICATED (HHS-HCC): ICD-10-CM

## 2025-03-07 DIAGNOSIS — I15.2 HYPERTENSION ASSOCIATED WITH DIABETES (MULTI): ICD-10-CM

## 2025-03-07 DIAGNOSIS — J01.00 ACUTE NON-RECURRENT MAXILLARY SINUSITIS: ICD-10-CM

## 2025-03-07 DIAGNOSIS — I48.91 ATRIAL FIBRILLATION WITH RVR (MULTI): ICD-10-CM

## 2025-03-07 PROCEDURE — 1036F TOBACCO NON-USER: CPT | Performed by: NURSE PRACTITIONER

## 2025-03-07 PROCEDURE — 99214 OFFICE O/P EST MOD 30 MIN: CPT | Performed by: NURSE PRACTITIONER

## 2025-03-07 PROCEDURE — 3079F DIAST BP 80-89 MM HG: CPT | Performed by: NURSE PRACTITIONER

## 2025-03-07 PROCEDURE — 3074F SYST BP LT 130 MM HG: CPT | Performed by: NURSE PRACTITIONER

## 2025-03-07 PROCEDURE — 3008F BODY MASS INDEX DOCD: CPT | Performed by: NURSE PRACTITIONER

## 2025-03-07 RX ORDER — MONTELUKAST SODIUM 10 MG/1
10 TABLET ORAL NIGHTLY
Qty: 90 TABLET | Refills: 3 | Status: SHIPPED | OUTPATIENT
Start: 2025-03-07

## 2025-03-07 RX ORDER — AZITHROMYCIN 250 MG/1
TABLET, FILM COATED ORAL
Qty: 6 TABLET | Refills: 0 | Status: SHIPPED | OUTPATIENT
Start: 2025-03-07 | End: 2025-03-12

## 2025-03-07 RX ORDER — FLUTICASONE PROPIONATE 50 MCG
2 SPRAY, SUSPENSION (ML) NASAL DAILY
Qty: 16 G | Refills: 11 | Status: SHIPPED | OUTPATIENT
Start: 2025-03-07

## 2025-03-07 RX ORDER — GUAIFENESIN 600 MG/1
1200 TABLET, EXTENDED RELEASE ORAL 2 TIMES DAILY PRN
Qty: 120 TABLET | Refills: 1 | Status: SHIPPED | OUTPATIENT
Start: 2025-03-07

## 2025-03-07 RX ORDER — LORATADINE 10 MG/1
10 TABLET ORAL DAILY
Qty: 90 TABLET | Refills: 3 | Status: SHIPPED | OUTPATIENT
Start: 2025-03-07

## 2025-03-07 ASSESSMENT — ENCOUNTER SYMPTOMS
DIFFICULTY URINATING: 0
CHEST TIGHTNESS: 0
FEVER: 0
APNEA: 0
CHILLS: 0
ARTHRALGIAS: 0
EYE PAIN: 0
SEIZURES: 0
EYE REDNESS: 0
NERVOUS/ANXIOUS: 0
NUMBNESS: 0
TROUBLE SWALLOWING: 0
WEAKNESS: 0
SPEECH DIFFICULTY: 0
MYALGIAS: 0
FLANK PAIN: 0
PALPITATIONS: 0
JOINT SWELLING: 0
FREQUENCY: 0
CONSTIPATION: 0
UNEXPECTED WEIGHT CHANGE: 0
WHEEZING: 0
DIARRHEA: 0
HEADACHES: 0
HEMATURIA: 0
DYSURIA: 0
ABDOMINAL DISTENTION: 0
FACIAL ASYMMETRY: 0
PHOTOPHOBIA: 0
ABDOMINAL PAIN: 0
DIZZINESS: 0
CHOKING: 0
FATIGUE: 0
VOMITING: 0
COUGH: 0
BACK PAIN: 0
CONFUSION: 0
SORE THROAT: 0
SLEEP DISTURBANCE: 0
NECK PAIN: 0
SHORTNESS OF BREATH: 0
NAUSEA: 0
BLOOD IN STOOL: 0
WOUND: 0

## 2025-03-07 NOTE — PROGRESS NOTES
"Subjective   Patient ID: Inge Vance is a 49 y.o. female who presents for Med Refill (MEDICATION REFILL, SINUS HEADACHE EVERYDAY, PT STATES THAT SHE FEEL LIKE SHE HAS KNOTS ON CHEST AND LEGS).      HPI:  Presents today for c/o sinus headaches daily x 1 month.  modifying factors consists of has allergies  associated symptoms consist of congestion on/off. Sinus pressure.  prior treatment consists of medication singular       C/O lumps to b/l breast x 1 month  modifying factors consists of  recent weight loss r/t surgery.  associated symptoms consist of left shin skin lump       prior treatment consists of medication none         10/14/24 gastric bypass. Fu appt next week with them         Visit Vitals  /84   Pulse 75   Ht 1.651 m (5' 5\")   Wt 76.6 kg (168 lb 12.8 oz)   LMP  (LMP Unknown)   BMI 28.09 kg/m²   OB Status Hysterectomy   Smoking Status Never   BSA 1.87 m²        Review of Systems   Constitutional:  Negative for chills, fatigue, fever and unexpected weight change.   HENT:  Negative for congestion, ear pain, sore throat and trouble swallowing.    Eyes:  Negative for photophobia, pain, redness and visual disturbance.   Respiratory:  Negative for apnea, cough, choking, chest tightness, shortness of breath and wheezing.    Cardiovascular:  Negative for chest pain, palpitations and leg swelling.   Gastrointestinal:  Negative for abdominal distention, abdominal pain, blood in stool, constipation, diarrhea, nausea and vomiting.   Genitourinary:  Negative for difficulty urinating, dysuria, flank pain, frequency, hematuria and urgency.   Musculoskeletal:  Negative for arthralgias, back pain, gait problem, joint swelling, myalgias and neck pain.   Skin:  Negative for rash and wound.   Neurological:  Negative for dizziness, seizures, syncope, facial asymmetry, speech difficulty, weakness, numbness and headaches.   Psychiatric/Behavioral:  Negative for confusion, sleep disturbance and suicidal ideas. The " patient is not nervous/anxious.        Objective     Physical Exam  Constitutional:       Appearance: Normal appearance. She is normal weight.   HENT:      Head: Normocephalic.   Eyes:      Extraocular Movements: Extraocular movements intact.      Conjunctiva/sclera: Conjunctivae normal.      Pupils: Pupils are equal, round, and reactive to light.   Cardiovascular:      Rate and Rhythm: Normal rate and regular rhythm.      Pulses: Normal pulses.      Heart sounds: Normal heart sounds.   Pulmonary:      Effort: Pulmonary effort is normal.      Breath sounds: Normal breath sounds.   Musculoskeletal:         General: Normal range of motion.      Cervical back: Normal range of motion.   Skin:     General: Skin is warm and dry.      Comments: left shin/above left ankle skin lump    Small lump noted above right breast   Neurological:      General: No focal deficit present.      Mental Status: She is alert and oriented to person, place, and time.   Psychiatric:         Mood and Affect: Mood normal.         Behavior: Behavior normal.         Thought Content: Thought content normal.         Judgment: Judgment normal.            Assessment/Plan   Problem List Items Addressed This Visit       Hypertension associated with diabetes (Multi)    Lung nodules    Relevant Orders    CT chest wo IV contrast    Mild persistent asthma    Relevant Medications    montelukast (Singulair) 10 mg tablet    Pulmonary hypertension (Multi)     Other Visit Diagnoses       Skin lumps    -  Primary    Relevant Orders    BI mammo bilateral diagnostic tomosynthesis    US extremity nonvascular real time w image documentation limited anatomic specific    Seasonal allergies        Relevant Medications    fluticasone (Flonase) 50 mcg/actuation nasal spray    loratadine (Claritin) 10 mg tablet    guaiFENesin (Mucinex) 600 mg 12 hr tablet    Atrial fibrillation with RVR (Multi)        Uncomplicated asthma, unspecified asthma severity, unspecified whether  persistent ([facility]-HCC)        Acute non-recurrent maxillary sinusitis        Relevant Medications    azithromycin (Zithromax) 250 mg tablet            WE DISCUSSED MOST COMMON SIDE EFFECTS OF PRESCRIBED MEDICATIONS. INDICATIONS, RISK, COMPLICATIONS, AND ALTERNATIVES OF MEDICATION/THERAPEUTICS WERE EXPLAINED AND DISCUSSED. PLEASE MONITOR CLOSELY FOR ANY UNTOWARD SIDE EFFECTS OR COMPLICATIONS OF MEDICATIONS. PATIENT IS STRONGLY ADVISED TO BE COMPLIANT WITH RECOMMENDATIONS. QUESTIONS AND CONCERNS WERE ADDRESSED. INSTRUCTED TO CALL, RETURN SOONER, OR GO TO THE ER,  IF SYMPTOMS PERSIST OR WORSEN. THEY VOICED UNDERSTANDING AND  DENIES FURTHER QUESTIONS AT THIS TIME.    TIME CODE  1. PREPARATION FOR PATIENT'S VISIT (REVIEWING CHART, CURRENT MEDICAL RECORDS, OUTSIDE HEALTH PROVIDER RECORDS, PREVIOUS HISTORY, EXAM, TEST, PROCEDURE, AND MEDICATIONS)  2. FACE TO FACE ENCOUNTER OBTAINING HISTORY FROM THE PATIENT/FAMILY/CAREGIVERS; PERFORMING EVALUATION AND EXAMINATION; ORDERING TESTS OR PROCEDURES; REFERRING AND COMMUNICATING WITH OTHER HEALTHCARE PROVIDERS; COUNSELING AND EDUCATION OF THE PATIENT/FAMILY/CAREGIVERS; INDEPENDENTLY INTERPRETING RESULTS (TESTS, LABS, PROCEDURES, IMAGING) AND COMMUNICATING AND EXPLAINING RESULTS TO THE PATIENT/FAMILY/CAREGIVERS  3. COORDINATION OF CARE; PREPARING AND PRINTING DISCHARGE INSTRUCTIONS AND ANY EDUCATIONAL MATERIAL FOR THE PATIENT/FAMILY/CAREGIVERS. DOCUMENTING CLINICAL INFORMATION IN THE ELECTRONIC MEDICAL RECORD   4. REVIEWING OARRS AS NEEDED    MDM  1) COMPLEXITY: MORE THAN 1 STABLE CHRONIC CONDITION ADDRESSED OR 1 ACUTE ILLNESS ADDRESSED   2)DATA: TESTS INTERPRETED AND OR ORDERED, TOOK INDEPENDENT HISTORY OR RECORDS REVIEWED  3)RISK: MODERATE RISK DUE TO NATURE OF MEDICAL CONDITIONS/COMORBIDITY OR MEDICATIONS ORDERED OR SURGICAL OR PROCEDURE REFERRAL    After testing

## 2025-03-07 NOTE — TELEPHONE ENCOUNTER
SCHEDULING IS REQUESTING AN ORDER FOR BILAT BREAST U/S -LIMITED TO BE ADDED JUST IN CASE RADIOLOGY NEEDS IT.  THANK YOU.

## 2025-03-28 ENCOUNTER — HOSPITAL ENCOUNTER (OUTPATIENT)
Dept: RADIOLOGY | Facility: HOSPITAL | Age: 50
Discharge: HOME | End: 2025-03-28
Payer: MEDICAID

## 2025-03-28 DIAGNOSIS — R91.8 LUNG NODULES: ICD-10-CM

## 2025-03-28 DIAGNOSIS — R22.9 SKIN LUMPS: ICD-10-CM

## 2025-03-28 PROCEDURE — 71250 CT THORAX DX C-: CPT

## 2025-03-28 PROCEDURE — 76882 US LMTD JT/FCL EVL NVASC XTR: CPT

## 2025-03-28 PROCEDURE — 77066 DX MAMMO INCL CAD BI: CPT

## 2025-03-29 LAB
25(OH)D3+25(OH)D2 SERPL-MCNC: 48 NG/ML (ref 30–100)
ALBUMIN SERPL-MCNC: 4.1 G/DL (ref 3.6–5.1)
ALP SERPL-CCNC: 47 U/L (ref 37–153)
ALT SERPL-CCNC: 21 U/L (ref 6–29)
ANION GAP SERPL CALCULATED.4IONS-SCNC: 9 MMOL/L (CALC) (ref 7–17)
AST SERPL-CCNC: 20 U/L (ref 10–35)
BASOPHILS # BLD AUTO: 52 CELLS/UL (ref 0–200)
BASOPHILS NFR BLD AUTO: 0.9 %
BILIRUB SERPL-MCNC: 0.4 MG/DL (ref 0.2–1.2)
BUN SERPL-MCNC: 15 MG/DL (ref 7–25)
CALCIUM SERPL-MCNC: 9.1 MG/DL (ref 8.6–10.4)
CHLORIDE SERPL-SCNC: 112 MMOL/L (ref 98–110)
CHOLEST SERPL-MCNC: 166 MG/DL
CHOLEST/HDLC SERPL: 3 (CALC)
CO2 SERPL-SCNC: 23 MMOL/L (ref 20–32)
CREAT SERPL-MCNC: 0.58 MG/DL (ref 0.5–1.03)
EGFRCR SERPLBLD CKD-EPI 2021: 109 ML/MIN/1.73M2
EOSINOPHIL # BLD AUTO: 168 CELLS/UL (ref 15–500)
EOSINOPHIL NFR BLD AUTO: 2.9 %
ERYTHROCYTE [DISTWIDTH] IN BLOOD BY AUTOMATED COUNT: 12.9 % (ref 11–15)
EST. AVERAGE GLUCOSE BLD GHB EST-MCNC: 100 MG/DL
EST. AVERAGE GLUCOSE BLD GHB EST-SCNC: 5.5 MMOL/L
GLUCOSE SERPL-MCNC: 90 MG/DL (ref 65–99)
HBA1C MFR BLD: 5.1 % OF TOTAL HGB
HCT VFR BLD AUTO: 36.7 % (ref 35–45)
HDLC SERPL-MCNC: 56 MG/DL
HGB BLD-MCNC: 11.8 G/DL (ref 11.7–15.5)
LDLC SERPL CALC-MCNC: 91 MG/DL (CALC)
LYMPHOCYTES # BLD AUTO: 2129 CELLS/UL (ref 850–3900)
LYMPHOCYTES NFR BLD AUTO: 36.7 %
MCH RBC QN AUTO: 31 PG (ref 27–33)
MCHC RBC AUTO-ENTMCNC: 32.2 G/DL (ref 32–36)
MCV RBC AUTO: 96.3 FL (ref 80–100)
MONOCYTES # BLD AUTO: 412 CELLS/UL (ref 200–950)
MONOCYTES NFR BLD AUTO: 7.1 %
NEUTROPHILS # BLD AUTO: 3039 CELLS/UL (ref 1500–7800)
NEUTROPHILS NFR BLD AUTO: 52.4 %
NONHDLC SERPL-MCNC: 110 MG/DL (CALC)
PLATELET # BLD AUTO: 292 THOUSAND/UL (ref 140–400)
PMV BLD REES-ECKER: 10.2 FL (ref 7.5–12.5)
POTASSIUM SERPL-SCNC: 3.7 MMOL/L (ref 3.5–5.3)
PROT SERPL-MCNC: 6 G/DL (ref 6.1–8.1)
PTH-INTACT SERPL-MCNC: 41 PG/ML (ref 16–77)
RBC # BLD AUTO: 3.81 MILLION/UL (ref 3.8–5.1)
SODIUM SERPL-SCNC: 144 MMOL/L (ref 135–146)
TRIGL SERPL-MCNC: 91 MG/DL
TSH SERPL-ACNC: 2.74 MIU/L
WBC # BLD AUTO: 5.8 THOUSAND/UL (ref 3.8–10.8)

## 2025-03-31 ENCOUNTER — TELEPHONE (OUTPATIENT)
Dept: PRIMARY CARE | Facility: CLINIC | Age: 50
End: 2025-03-31
Payer: MEDICAID

## 2025-03-31 NOTE — TELEPHONE ENCOUNTER
In 11/2024 you put in a pain management referral for the pt. I contacted her today and she is declining the referral. I have cancelled her active request to schedule. Thanks.

## 2025-04-01 ENCOUNTER — TELEPHONE (OUTPATIENT)
Dept: PRIMARY CARE | Facility: CLINIC | Age: 50
End: 2025-04-01
Payer: MEDICAID

## 2025-04-01 DIAGNOSIS — R22.42 SKIN LUMP OF LEG, LEFT: Primary | ICD-10-CM

## 2025-04-01 NOTE — TELEPHONE ENCOUNTER
US extremity showed possible cyst to left lower leg/ankle. CT ordered to further evaluate. Please let her know, then forward to [facility] for precert. Thanks

## 2025-04-03 ENCOUNTER — DOCUMENTATION (OUTPATIENT)
Dept: PRIMARY CARE | Facility: CLINIC | Age: 50
End: 2025-04-03
Payer: MEDICAID

## 2025-04-03 NOTE — TELEPHONE ENCOUNTER
PT INFORMED, TOLD TO DISREGARD LETTER THAT WAS SENT OUT TODAY.     ADAN PLEASE PRECERT FOR CT SCAN

## 2025-04-04 ENCOUNTER — APPOINTMENT (OUTPATIENT)
Dept: PRIMARY CARE | Facility: CLINIC | Age: 50
End: 2025-04-04
Payer: MEDICAID

## 2025-04-25 ENCOUNTER — APPOINTMENT (OUTPATIENT)
Dept: PRIMARY CARE | Facility: CLINIC | Age: 50
End: 2025-04-25
Payer: MEDICAID

## 2025-05-15 DIAGNOSIS — E55.9 VITAMIN D DEFICIENCY: ICD-10-CM

## 2025-05-15 RX ORDER — ERGOCALCIFEROL 1.25 MG/1
CAPSULE ORAL
Qty: 13 CAPSULE | Refills: 3 | Status: SHIPPED | OUTPATIENT
Start: 2025-05-18

## 2025-08-26 DIAGNOSIS — G43.709 CHRONIC MIGRAINE WITHOUT AURA WITHOUT STATUS MIGRAINOSUS, NOT INTRACTABLE: ICD-10-CM

## 2025-08-27 RX ORDER — SUMATRIPTAN SUCCINATE 50 MG/1
50 TABLET ORAL 2 TIMES DAILY PRN
Qty: 9 TABLET | Refills: 11 | Status: SHIPPED | OUTPATIENT
Start: 2025-08-27